# Patient Record
Sex: FEMALE | Race: WHITE | NOT HISPANIC OR LATINO
[De-identification: names, ages, dates, MRNs, and addresses within clinical notes are randomized per-mention and may not be internally consistent; named-entity substitution may affect disease eponyms.]

---

## 2018-03-26 ENCOUNTER — APPOINTMENT (OUTPATIENT)
Dept: OBGYN | Facility: CLINIC | Age: 63
End: 2018-03-26

## 2018-09-17 ENCOUNTER — APPOINTMENT (OUTPATIENT)
Dept: OBGYN | Facility: CLINIC | Age: 63
End: 2018-09-17
Payer: COMMERCIAL

## 2018-09-17 VITALS
BODY MASS INDEX: 38.49 KG/M2 | SYSTOLIC BLOOD PRESSURE: 133 MMHG | HEART RATE: 64 BPM | HEIGHT: 68 IN | WEIGHT: 254 LBS | DIASTOLIC BLOOD PRESSURE: 75 MMHG

## 2018-09-17 DIAGNOSIS — Z01.419 ENCOUNTER FOR GYNECOLOGICAL EXAMINATION (GENERAL) (ROUTINE) W/OUT ABNORMAL FINDINGS: ICD-10-CM

## 2018-09-17 PROCEDURE — 99396 PREV VISIT EST AGE 40-64: CPT

## 2018-09-19 LAB — HPV HIGH+LOW RISK DNA PNL CVX: NOT DETECTED

## 2018-09-20 LAB — CYTOLOGY CVX/VAG DOC THIN PREP: NORMAL

## 2018-09-24 DIAGNOSIS — N64.89 OTHER SPECIFIED DISORDERS OF BREAST: ICD-10-CM

## 2018-09-28 PROBLEM — N64.89 BREAST ASYMMETRY IN FEMALE: Status: ACTIVE | Noted: 2018-09-28

## 2020-07-24 ENCOUNTER — APPOINTMENT (OUTPATIENT)
Dept: RHEUMATOLOGY | Facility: CLINIC | Age: 65
End: 2020-07-24
Payer: MEDICAID

## 2020-07-24 VITALS
DIASTOLIC BLOOD PRESSURE: 60 MMHG | BODY MASS INDEX: 35.61 KG/M2 | TEMPERATURE: 97.7 F | OXYGEN SATURATION: 98 % | WEIGHT: 235 LBS | HEIGHT: 68 IN | SYSTOLIC BLOOD PRESSURE: 110 MMHG | HEART RATE: 72 BPM

## 2020-07-24 PROCEDURE — 36415 COLL VENOUS BLD VENIPUNCTURE: CPT

## 2020-07-24 PROCEDURE — 99204 OFFICE O/P NEW MOD 45 MIN: CPT | Mod: 25

## 2020-07-25 LAB
ALBUMIN SERPL ELPH-MCNC: 4.5 G/DL
ALP BLD-CCNC: 82 U/L
ALT SERPL-CCNC: 9 U/L
ANION GAP SERPL CALC-SCNC: 15 MMOL/L
APPEARANCE: CLEAR
AST SERPL-CCNC: 16 U/L
BASOPHILS # BLD AUTO: 0.05 K/UL
BASOPHILS NFR BLD AUTO: 0.6 %
BILIRUB SERPL-MCNC: 0.3 MG/DL
BILIRUBIN URINE: NEGATIVE
BLOOD URINE: NEGATIVE
BUN SERPL-MCNC: 21 MG/DL
CALCIUM SERPL-MCNC: 10.1 MG/DL
CHLORIDE SERPL-SCNC: 103 MMOL/L
CO2 SERPL-SCNC: 28 MMOL/L
COLOR: YELLOW
CREAT SERPL-MCNC: 1.03 MG/DL
CRP SERPL-MCNC: 1.33 MG/DL
EOSINOPHIL # BLD AUTO: 0.31 K/UL
EOSINOPHIL NFR BLD AUTO: 3.8 %
ERYTHROCYTE [SEDIMENTATION RATE] IN BLOOD BY WESTERGREN METHOD: 22 MM/HR
GLUCOSE QUALITATIVE U: NEGATIVE
GLUCOSE SERPL-MCNC: 151 MG/DL
HCT VFR BLD CALC: 39.6 %
HGB BLD-MCNC: 12.2 G/DL
IMM GRANULOCYTES NFR BLD AUTO: 0.4 %
KETONES URINE: NORMAL
LEUKOCYTE ESTERASE URINE: NEGATIVE
LYMPHOCYTES # BLD AUTO: 1.56 K/UL
LYMPHOCYTES NFR BLD AUTO: 19.4 %
MAN DIFF?: NORMAL
MCHC RBC-ENTMCNC: 28.4 PG
MCHC RBC-ENTMCNC: 30.8 GM/DL
MCV RBC AUTO: 92.3 FL
MONOCYTES # BLD AUTO: 0.4 K/UL
MONOCYTES NFR BLD AUTO: 5 %
NEUTROPHILS # BLD AUTO: 5.71 K/UL
NEUTROPHILS NFR BLD AUTO: 70.8 %
NITRITE URINE: NEGATIVE
PH URINE: 5.5
PLATELET # BLD AUTO: 218 K/UL
POTASSIUM SERPL-SCNC: 3.8 MMOL/L
PROT SERPL-MCNC: 6.6 G/DL
PROTEIN URINE: NEGATIVE
RBC # BLD: 4.29 M/UL
RBC # FLD: 13.6 %
RHEUMATOID FACT SER QL: 10 IU/ML
SODIUM SERPL-SCNC: 146 MMOL/L
SPECIFIC GRAVITY URINE: 1.02
UROBILINOGEN URINE: NORMAL
WBC # FLD AUTO: 8.06 K/UL

## 2020-07-25 NOTE — CONSULT LETTER
[Dear  ___] : Dear  [unfilled], [Sincerely,] : Sincerely, [Please see my note below.] : Please see my note below. [Consult Letter:] : I had the pleasure of evaluating your patient, [unfilled]. [FreeTextEntry3] : Laila Crowe MD

## 2020-07-25 NOTE — HISTORY OF PRESENT ILLNESS
[FreeTextEntry1] : This is a 63 y/o woman with hx of venous insufficiency, OA, right ruptured achilles tendon, and chronic back pain.  \par \par She developed right hand pain 1 year ago, mainly in [facility].\par She saw Dr. Sinha who told her she had some sort of arthritis for which he Rx'd her prednisone.  \par She has since developed pain in b/L knees, b/L feet, b/L shoulders\par The prednisone helped maybe 20%\par Her stiffness is 10-20 minutes\par She hasn't had prednisone since 6/2019.  \par She has been self treating with ibuprofen 800 BID which helps.  Denies any abd issues.  \par \par Aleve didn't help.  \par Had allergic reaction with mobic and voltaren. \par She had Tramadol but it made her too drowsy.  \par \par No hx of inflammatory eye disease.\par Currently has poison ivy.\par Goes to PT for her achilles.  \par \par She had HA injection, but it didn't help right knee.  She had her left knee done 2 years ago, and it was OK.  \par \par No hx of psoriasis or Crohn's personally or in family.\par Father had RA

## 2020-07-25 NOTE — ASSESSMENT
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  \par \par Plan\par -XR hands\par -XR SI joints\par -labs as ordered\par -Rx celebrex 200mg daily trial.  Has hx of allergic reaction to diclofenac and meloxicam, but not ibuprofen or naproxen.  Will avoid acetates and oxicam NSAID classes.  \par -Bring in old MRI next visit\par f/u 2:15pm  Aug 20th

## 2020-07-28 LAB
ANA SER IF-ACNC: NEGATIVE
C3 SERPL-MCNC: 148 MG/DL
C4 SERPL-MCNC: 32 MG/DL
CCP AB SER IA-ACNC: 8 UNITS
DSDNA AB SER-ACNC: <12 IU/ML
ENA RNP AB SER IA-ACNC: <0.2 AL
ENA SM AB SER IA-ACNC: <0.2 AL
ENA SS-A AB SER IA-ACNC: <0.2 AL
ENA SS-B AB SER IA-ACNC: <0.2 AL
RF+CCP IGG SER-IMP: NEGATIVE
THYROGLOB AB SERPL-ACNC: <20 IU/ML
THYROPEROXIDASE AB SERPL IA-ACNC: <10 IU/ML

## 2020-07-30 LAB — HLA-B27 RELATED AG QL: NORMAL

## 2020-08-18 ENCOUNTER — RX RENEWAL (OUTPATIENT)
Age: 65
End: 2020-08-18

## 2020-08-20 ENCOUNTER — APPOINTMENT (OUTPATIENT)
Dept: RHEUMATOLOGY | Facility: CLINIC | Age: 65
End: 2020-08-20
Payer: MEDICAID

## 2020-08-20 VITALS
OXYGEN SATURATION: 98 % | HEIGHT: 68 IN | HEART RATE: 72 BPM | BODY MASS INDEX: 36.37 KG/M2 | SYSTOLIC BLOOD PRESSURE: 130 MMHG | TEMPERATURE: 98.2 F | WEIGHT: 240 LBS | DIASTOLIC BLOOD PRESSURE: 72 MMHG

## 2020-08-20 DIAGNOSIS — Z85.828 PERSONAL HISTORY OF OTHER MALIGNANT NEOPLASM OF SKIN: ICD-10-CM

## 2020-08-20 DIAGNOSIS — M25.50 PAIN IN UNSPECIFIED JOINT: ICD-10-CM

## 2020-08-20 PROCEDURE — 99214 OFFICE O/P EST MOD 30 MIN: CPT | Mod: 25

## 2020-08-20 PROCEDURE — 36415 COLL VENOUS BLD VENIPUNCTURE: CPT

## 2020-08-20 NOTE — HISTORY OF PRESENT ILLNESS
[FreeTextEntry1] : This is a 63 y/o woman with hx of venous insufficiency, OA, infiltrative basal cell carcinoma, right ruptured achilles tendon, and chronic back pain. \par \par She developed right hand pain 1 year ago, mainly in [facility].\par She saw Dr. Sinha who told her she had some sort of arthritis for which he Rx'd her prednisone. \par She has since developed pain in b/L knees, b/L feet, b/L shoulders\par The prednisone helped maybe 20%.  She now reports that after the second 3 week course of prednisone, she did feel better.  \par Her stiffness is 10-20 minutes\par She hasn't had prednisone since 6/2019. \par She has been self treating with ibuprofen 800 BID which helps. Denies any abd issues. \par \par Aleve didn't help. \par Had allergic reaction with mobic and voltaren. \par She had Tramadol but it made her too drowsy. \par \par No hx of inflammatory eye disease.\par Currently has poison ivy.\par Goes to PT for her achilles. \par \par She had HA injection, but it didn't help right knee. She had her left knee done 2 years ago, and it was OK. \par \par No hx of psoriasis or Crohn's personally or in family.\par Father had RA \par  \par

## 2020-08-20 NOTE — ASSESSMENT
[FreeTextEntry1] : Seronegative RA\par \par Plan\par -XR hands were unremarkable\par -XR SI joints were negative.  \par -labs reviewed, neg RF/CCP, Elevated ESR/CRP\par -Rx celebrex 200mg daily.  She tolerated this well, and it did improve symptoms, but wore off by 4:30 in the afternoon.  She went back to ibuprofen 800mg BID and would like to continue this. Rx placed.  Has hx of allergic reaction to diclofenac and meloxicam, but not ibuprofen or naproxen. Will avoid acetates and oxicam NSAID classes. \par -MRI ankle reviewed, fluid in ankle joint, but no report of synovitis\par - Reviewed potential side effects of MTX which include but are not limited to elevated liver enzymes with liver damage, cytopenias/decreased blood counts, pulmonary toxicity, allergic reaction, oral sores, alopecia, birth defects.\par -Patient is aware to hold MTX if develops infection.  Patient is aware to avoid alcohol while on this medication.  \par -Rx folc acid 1mg daily\par -Baseline CXR and Hep B/C screening.  Can start MTX after these are obtained.  \par -Rx prednisone 20mg daily x 1 week, then 15mg daily x 1 week, then stop.  Repeat if needed.  \par f/u 6-8 weeks.

## 2020-08-21 LAB
HBV CORE IGG+IGM SER QL: NONREACTIVE
HBV SURFACE AB SERPL IA-ACNC: >1000 MIU/ML
HBV SURFACE AG SER QL: NONREACTIVE
HCV AB SER QL: NONREACTIVE
HCV S/CO RATIO: 0.27 S/CO

## 2020-09-17 LAB
ALBUMIN SERPL ELPH-MCNC: 4.1 G/DL
ALP BLD-CCNC: 70 U/L
ALT SERPL-CCNC: 14 U/L
ANION GAP SERPL CALC-SCNC: 10 MMOL/L
AST SERPL-CCNC: 16 U/L
BASOPHILS # BLD AUTO: 0.02 K/UL
BASOPHILS NFR BLD AUTO: 0.3 %
BILIRUB SERPL-MCNC: 0.4 MG/DL
BUN SERPL-MCNC: 25 MG/DL
CALCIUM SERPL-MCNC: 9.7 MG/DL
CHLORIDE SERPL-SCNC: 104 MMOL/L
CO2 SERPL-SCNC: 29 MMOL/L
CREAT SERPL-MCNC: 1.16 MG/DL
EOSINOPHIL # BLD AUTO: 0.31 K/UL
EOSINOPHIL NFR BLD AUTO: 4.9 %
GLUCOSE SERPL-MCNC: 105 MG/DL
HCT VFR BLD CALC: 38.6 %
HGB BLD-MCNC: 12.2 G/DL
IMM GRANULOCYTES NFR BLD AUTO: 0.5 %
LYMPHOCYTES # BLD AUTO: 1.58 K/UL
LYMPHOCYTES NFR BLD AUTO: 24.9 %
MAN DIFF?: NORMAL
MCHC RBC-ENTMCNC: 28 PG
MCHC RBC-ENTMCNC: 31.6 GM/DL
MCV RBC AUTO: 88.5 FL
MONOCYTES # BLD AUTO: 0.51 K/UL
MONOCYTES NFR BLD AUTO: 8 %
NEUTROPHILS # BLD AUTO: 3.89 K/UL
NEUTROPHILS NFR BLD AUTO: 61.4 %
PLATELET # BLD AUTO: 205 K/UL
POTASSIUM SERPL-SCNC: 4 MMOL/L
PROT SERPL-MCNC: 6.3 G/DL
RBC # BLD: 4.36 M/UL
RBC # FLD: 13.6 %
SODIUM SERPL-SCNC: 142 MMOL/L
WBC # FLD AUTO: 6.34 K/UL

## 2020-10-01 ENCOUNTER — APPOINTMENT (OUTPATIENT)
Dept: RHEUMATOLOGY | Facility: CLINIC | Age: 65
End: 2020-10-01
Payer: MEDICAID

## 2020-10-01 VITALS
TEMPERATURE: 97.9 F | OXYGEN SATURATION: 98 % | DIASTOLIC BLOOD PRESSURE: 68 MMHG | BODY MASS INDEX: 35.28 KG/M2 | SYSTOLIC BLOOD PRESSURE: 124 MMHG | HEART RATE: 70 BPM | WEIGHT: 232 LBS

## 2020-10-01 PROCEDURE — 20610 DRAIN/INJ JOINT/BURSA W/O US: CPT | Mod: 50

## 2020-10-01 PROCEDURE — 99214 OFFICE O/P EST MOD 30 MIN: CPT | Mod: 25

## 2020-10-01 RX ORDER — CELECOXIB 200 MG/1
200 CAPSULE ORAL
Qty: 30 | Refills: 0 | Status: DISCONTINUED | COMMUNITY
Start: 2020-07-24 | End: 2020-10-01

## 2020-10-01 NOTE — PROCEDURE
[FreeTextEntry1] : Procedure: b/L  knee CS injection\par Date: 10/1/20\par The procedure risks was discussed with the patient.\par Indications: therapeutic purposes \par #1 site identified in the Left knee . Verbal consent was obtained. \par Anesthesia was with ethyl chloride.\par The patient was prepped with betadine solution. \par A 25 gauge 1.5 inch needle was used.\par Injectables: Depomedrol 80 mg was injected into the site The Depomedrol was mixed with 1mL of xylocaine 1%. \par The patient tolerated the procedure well..\par There were no complications. Handouts/patient instructions were given to patient . patient was instructed to call if redness at site, a decrease in range of motion or an increase in pain is noted after procedure.\par \par #2 Identical procedure performed for right knee

## 2020-10-01 NOTE — HISTORY OF PRESENT ILLNESS
[FreeTextEntry1] : This is a 66 y/o woman with hx of venous insufficiency, OA, infiltrative basal cell carcinoma, right ruptured achilles tendon, and chronic back pain here for f/u of seronegative RA.\par \par She started MTX last visit 8/2020 and is feeling better already.  Completed a course of prednisone.  \par Denies any issues with MTX, no oral sores, rashes, hair loss, abdominal pain, cough, sob.  \par c/o knee and back pain\par Interested in muscle relaxant.\par \par Prior hx\par She developed right hand pain 1 year ago, mainly in [facility].\par She saw Dr. Sinha who told her she had some sort of arthritis for which he Rx'd her prednisone. \par She has since developed pain in b/L knees, b/L feet, b/L shoulders\par The prednisone helped maybe 20%. She now reports that after the second 3 week course of prednisone, she did feel better. \par Her stiffness is 10-20 minutes\par She hasn't had prednisone since 6/2019. \par She has been self treating with ibuprofen 800 BID which helps. Denies any abd issues. \par \par Aleve didn't help. \par Had allergic reaction with mobic and voltaren. \par She had Tramadol but it made her too drowsy. \par \par No hx of inflammatory eye disease.\par Currently has poison ivy.\par Goes to PT for her achilles. \par \par She had HA injection, but it didn't help right knee. She had her left knee done 2 years ago, and it was OK. \par \par No hx of psoriasis or Crohn's personally or in family.\par Father had RA \par

## 2020-10-01 NOTE — ASSESSMENT
[FreeTextEntry1] : Seronegative RA\par \par Plan\par -XR hands were unremarkable\par -XR SI joints were negative. \par -labs reviewed, neg RF/CCP, Elevated ESR/CRP\par -She is back to ibuprofen 800mg BID and would like to continue this. Rx placed. Has hx of allergic reaction to diclofenac and meloxicam, but not ibuprofen or naproxen. Will avoid acetates and oxicam NSAID classes. \par -MRI ankle reviewed, fluid in ankle joint, but no report of synovitis\par - Reviewed potential side effects of MTX which include but are not limited to elevated liver enzymes with liver damage, cytopenias/decreased blood counts, pulmonary toxicity, allergic reaction, oral sores, alopecia, birth defects.\par -Inc MTX to 5 tabs q week.  \par -Patient is aware to hold MTX if develops infection. Patient is aware to avoid alcohol while on this medication. \par -Rx folc acid 1mg daily\par -Baseline CXR and Hep B/C screening. Can start MTX after these are obtained. \par -Completed prednisone course\par \par OA knees\par -IA depomedrol 80mg b/L\par \par Back pain. \par -Rx tizanidine 2mg QHS for back pain\par \par f/u 5 weeks\par

## 2020-10-27 ENCOUNTER — RX RENEWAL (OUTPATIENT)
Age: 65
End: 2020-10-27

## 2020-11-05 ENCOUNTER — APPOINTMENT (OUTPATIENT)
Dept: RHEUMATOLOGY | Facility: CLINIC | Age: 65
End: 2020-11-05
Payer: MEDICAID

## 2020-11-05 VITALS
TEMPERATURE: 97.6 F | HEART RATE: 72 BPM | SYSTOLIC BLOOD PRESSURE: 112 MMHG | OXYGEN SATURATION: 99 % | WEIGHT: 223 LBS | DIASTOLIC BLOOD PRESSURE: 70 MMHG | BODY MASS INDEX: 33.8 KG/M2 | HEIGHT: 68 IN

## 2020-11-05 PROCEDURE — 99072 ADDL SUPL MATRL&STAF TM PHE: CPT

## 2020-11-05 PROCEDURE — 36415 COLL VENOUS BLD VENIPUNCTURE: CPT

## 2020-11-05 PROCEDURE — 99214 OFFICE O/P EST MOD 30 MIN: CPT | Mod: 25

## 2020-11-05 RX ORDER — PREDNISONE 5 MG/1
5 TABLET ORAL
Qty: 49 | Refills: 1 | Status: DISCONTINUED | COMMUNITY
Start: 2020-08-20 | End: 2020-11-05

## 2020-11-05 NOTE — HISTORY OF PRESENT ILLNESS
[FreeTextEntry1] : This is a 64 y/o woman with hx of venous insufficiency, OA, infiltrative basal cell carcinoma, right ruptured achilles tendon, and chronic back pain here for f/u of seronegative RA.\par \par She started MTX last visit 8/2020 and is feeling better already. Completed a course of prednisone. \par Denies any issues with MTX, no oral sores, rashes, hair loss, abdominal pain, cough, sob. \par c/o knee and back pain\par \par Hands are better, but knees still bother her.  \par Her back bothers her.\par Ibuprofen is effective for the back\par Knees bother her most.  \par \par AM stiffness is 5 minutes.  \par \par Prior hx\par She developed right hand pain 1 year ago, mainly in [facility].\par She saw Dr. Sinha who told her she had some sort of arthritis for which he Rx'd her prednisone. \par She has since developed pain in b/L knees, b/L feet, b/L shoulders\par The prednisone helped maybe 20%. She now reports that after the second 3 week course of prednisone, she did feel better. \par Her stiffness is 10-20 minutes\par She hasn't had prednisone since 6/2019. \par She has been self treating with ibuprofen 800 BID which helps. Denies any abd issues. \par \par Aleve didn't help. \par Had allergic reaction with mobic and voltaren. \par She had Tramadol but it made her too drowsy. \par \par No hx of inflammatory eye disease.\par Currently has poison ivy.\par Goes to PT for her achilles. \par \par \par \par She had HA injection, but it didn't help right knee. She had her left knee done 2 years ago, and it was OK. \par \par No hx of psoriasis or Crohn's personally or in family.\par Father had RA

## 2020-11-05 NOTE — ASSESSMENT
[FreeTextEntry1] : Seronegative RA\par \par Plan\par -XR hands were unremarkable\par -XR SI joints were negative. \par -labs reviewed, neg RF/CCP, Elevated ESR/CRP\par -She takes ibuprofen 800mg once in a day as needed and would like to continue this. Rx placed. Has hx of allergic reaction to diclofenac and meloxicam, but not ibuprofen or naproxen. Will avoid acetates and oxicam NSAID classes. Sample of duexis given and advised if she wants to continue with Pepcid separately from OTC, she can.  \par -MRI ankle reviewed, fluid in ankle joint, but no report of synovitis\par - Reviewed potential side effects of MTX which include but are not limited to elevated liver enzymes with liver damage, cytopenias/decreased blood counts, pulmonary toxicity, allergic reaction, oral sores, alopecia, birth defects.\par -cont MTX 5 tabs q week, labs today.   \par -Patient is aware to hold MTX if develops infection. Patient is aware to avoid alcohol while on this medication. \par -Rx folc acid 1mg daily\par -Baseline CXR and Hep B/C screening. Can start MTX after these are obtained. \par -Completed prednisone course\par \par OA knees\par -IA depomedrol 80mg b/L was not all that helpful.  \par \par Back pain- improved with med\par -Rx tizanidine 2mg QHS for back pain\par \par f/u 1 week after MRI of knee.  She will call to make appt as soon as she makes appt for MRI knee.  \par . \par

## 2020-11-06 ENCOUNTER — TRANSCRIPTION ENCOUNTER (OUTPATIENT)
Age: 65
End: 2020-11-06

## 2020-11-06 LAB
ALBUMIN SERPL ELPH-MCNC: 4.6 G/DL
ALP BLD-CCNC: 77 U/L
ALT SERPL-CCNC: 15 U/L
ANION GAP SERPL CALC-SCNC: 13 MMOL/L
AST SERPL-CCNC: 17 U/L
BASOPHILS # BLD AUTO: 0.03 K/UL
BASOPHILS NFR BLD AUTO: 0.3 %
BILIRUB SERPL-MCNC: 0.4 MG/DL
BUN SERPL-MCNC: 16 MG/DL
CALCIUM SERPL-MCNC: 10 MG/DL
CHLORIDE SERPL-SCNC: 101 MMOL/L
CO2 SERPL-SCNC: 28 MMOL/L
CREAT SERPL-MCNC: 1.15 MG/DL
EOSINOPHIL # BLD AUTO: 0.17 K/UL
EOSINOPHIL NFR BLD AUTO: 1.7 %
GLUCOSE SERPL-MCNC: 112 MG/DL
HCT VFR BLD CALC: 38.1 %
HGB BLD-MCNC: 12.5 G/DL
IMM GRANULOCYTES NFR BLD AUTO: 0.3 %
LYMPHOCYTES # BLD AUTO: 2.23 K/UL
LYMPHOCYTES NFR BLD AUTO: 21.9 %
MAN DIFF?: NORMAL
MCHC RBC-ENTMCNC: 29.1 PG
MCHC RBC-ENTMCNC: 32.8 GM/DL
MCV RBC AUTO: 88.8 FL
MONOCYTES # BLD AUTO: 0.54 K/UL
MONOCYTES NFR BLD AUTO: 5.3 %
NEUTROPHILS # BLD AUTO: 7.19 K/UL
NEUTROPHILS NFR BLD AUTO: 70.5 %
PLATELET # BLD AUTO: 249 K/UL
POTASSIUM SERPL-SCNC: 3.6 MMOL/L
PROT SERPL-MCNC: 6.7 G/DL
RBC # BLD: 4.29 M/UL
RBC # FLD: 15 %
SODIUM SERPL-SCNC: 142 MMOL/L
WBC # FLD AUTO: 10.19 K/UL

## 2020-11-10 ENCOUNTER — APPOINTMENT (OUTPATIENT)
Dept: VASCULAR SURGERY | Facility: CLINIC | Age: 65
End: 2020-11-10
Payer: MEDICAID

## 2020-11-10 VITALS
SYSTOLIC BLOOD PRESSURE: 120 MMHG | BODY MASS INDEX: 35.63 KG/M2 | HEART RATE: 66 BPM | TEMPERATURE: 97.4 F | HEIGHT: 67 IN | OXYGEN SATURATION: 98 % | RESPIRATION RATE: 16 BRPM | WEIGHT: 227.03 LBS | DIASTOLIC BLOOD PRESSURE: 69 MMHG

## 2020-11-10 PROCEDURE — 99072 ADDL SUPL MATRL&STAF TM PHE: CPT

## 2020-11-10 PROCEDURE — 99203 OFFICE O/P NEW LOW 30 MIN: CPT

## 2020-11-10 PROCEDURE — 93970 EXTREMITY STUDY: CPT

## 2020-11-10 RX ORDER — ZOLPIDEM TARTRATE 10 MG/1
10 TABLET, FILM COATED ORAL
Refills: 0 | Status: ACTIVE | COMMUNITY

## 2020-11-10 RX ORDER — ZOLPIDEM TARTRATE 5 MG/1
TABLET ORAL
Refills: 0 | Status: COMPLETED | COMMUNITY
End: 2020-11-10

## 2020-11-10 RX ORDER — ESCITALOPRAM OXALATE 10 MG/1
10 TABLET ORAL
Qty: 30 | Refills: 0 | Status: ACTIVE | COMMUNITY
Start: 2020-07-07

## 2020-11-20 ENCOUNTER — APPOINTMENT (OUTPATIENT)
Dept: RHEUMATOLOGY | Facility: CLINIC | Age: 65
End: 2020-11-20

## 2020-11-24 ENCOUNTER — APPOINTMENT (OUTPATIENT)
Dept: RHEUMATOLOGY | Facility: CLINIC | Age: 65
End: 2020-11-24
Payer: MEDICAID

## 2020-11-24 VITALS
DIASTOLIC BLOOD PRESSURE: 60 MMHG | HEART RATE: 59 BPM | WEIGHT: 220 LBS | TEMPERATURE: 97 F | SYSTOLIC BLOOD PRESSURE: 120 MMHG | OXYGEN SATURATION: 98 % | HEIGHT: 67 IN | BODY MASS INDEX: 34.53 KG/M2

## 2020-11-24 PROCEDURE — 20610 DRAIN/INJ JOINT/BURSA W/O US: CPT | Mod: RT

## 2020-11-24 PROCEDURE — 99214 OFFICE O/P EST MOD 30 MIN: CPT | Mod: 25

## 2020-11-24 RX ORDER — METHYLPRED ACET/NACL,ISO-OS/PF 80 MG/ML
80 VIAL (ML) INJECTION
Qty: 1 | Refills: 0 | Status: COMPLETED | OUTPATIENT
Start: 2020-11-24

## 2020-11-24 RX ADMIN — METHYLPREDNISOLONE ACETATE 0 MG/ML: 80 INJECTION, SUSPENSION INTRA-ARTICULAR; INTRALESIONAL; INTRAMUSCULAR; SOFT TISSUE at 00:00

## 2020-11-24 NOTE — PROCEDURE
[FreeTextEntry1] : Procedure: Right PAB CS injection\par Date: 11/24/20\par The procedure risks was discussed with the patient.\par Indications: therapeutic purposes \par #1 site identified in the Right PAB . Verbal consent was obtained. \par Anesthesia was with ethyl chloride.\par The patient was prepped with betadine solution. \par A 25 gauge 1.5 inch needle was used.\par Injectables: Depomedrol 80 mg was injected into the site The Depomedrol was mixed with 1mL of xylocaine 1%. \par The patient tolerated the procedure well..\par There were no complications. Handouts/patient instructions were given to patient . patient was instructed to call if redness at site, a decrease in range of motion or an increase in pain is noted after procedure. \par  \par \par

## 2020-11-24 NOTE — ASSESSMENT
[FreeTextEntry1] : \par Plan\par -XR hands were unremarkable\par -XR SI joints were negative. \par -labs reviewed, neg RF/CCP, Elevated ESR/CRP\par -She takes ibuprofen 800mg once in a day as needed and would like to continue this. Rx placed. Has hx of allergic reaction to diclofenac and meloxicam, but not ibuprofen or naproxen. Will avoid acetates and oxicam NSAID classes. Sample of duexis given and advised if she wants to continue with Pepcid separately from OTC, she can. \par -MRI ankle reviewed, fluid in ankle joint, but no report of synovitis\par - Reviewed potential side effects of MTX which include but are not limited to elevated liver enzymes with liver damage, cytopenias/decreased blood counts, pulmonary toxicity, allergic reaction, oral sores, alopecia, birth defects.\par -cont MTX 5 tabs q week, labs 12/5/20\par -Patient is aware to hold MTX if develops infection. Patient is aware to avoid alcohol while on this medication. \par -Rx folc acid 1mg daily\par -Baseline CXR and Hep B/C screening were negative\par -Completed prednisone course\par \par OA knees/knee pain\par -IA depomedrol 80mg b/L was not all that helpful. \par -Orthopedic eval for meniscal tear, partially ruptured cyst\par \par Right PAB\par -80mg depomedrol injection\par \par Back pain- improved with med\par -Rx tizanidine 2mg QHS for back pain\par \par \par f/u end of Decmebver\par

## 2020-11-24 NOTE — HISTORY OF PRESENT ILLNESS
[FreeTextEntry1] : This is a 64 y/o woman with hx of venous insufficiency, OA, infiltrative basal cell carcinoma, right ruptured achilles tendon, and chronic back pain here for f/u of seronegative RA.\par \par She started MTX  8/2020 and has felt improvement.   Completed a course of prednisone. \par Denies any issues with MTX, no oral sores, rashes, hair loss, abdominal pain, cough, sob. \par c/o knee and back pain\par Pain level is 6/10 inintensity with 5 minutes of AM stiffness.  \par Latest labs 11/2/20 show normal ESR, CRP and 11/5/20 CBC, CMP unremarkable.  \par Hands are better, but knees still bother her. \par Her back bothers her.\par Ibuprofen is effective for the back\par Knees bother her most. She has pain mainly in the back.  She also has pain in right medial knee.  \par \par MRI right knee shows 11/13/20 meniscal tear, partial rupture of para meniscal cyst.  Small joint effusion, chondromalacia.  \par \par AM stiffness is 5 minutes. \par \par Prior hx\par She developed right hand pain 1 year ago, mainly in [facility].\par She saw Dr. Sinha who told her she had some sort of arthritis for which he Rx'd her prednisone. \par She has since developed pain in b/L knees, b/L feet, b/L shoulders\par The prednisone helped maybe 20%. She now reports that after the second 3 week course of prednisone, she did feel better. \par Her stiffness is 10-20 minutes\par She hasn't had prednisone since 6/2019. \par She has been self treating with ibuprofen 800 BID which helps. Denies any abd issues. \par \par Aleve didn't help. \par Had allergic reaction with mobic and voltaren. \par She had Tramadol but it made her too drowsy. \par

## 2020-12-15 DIAGNOSIS — Z01.818 ENCOUNTER FOR OTHER PREPROCEDURAL EXAMINATION: ICD-10-CM

## 2020-12-17 ENCOUNTER — APPOINTMENT (OUTPATIENT)
Dept: DISASTER EMERGENCY | Facility: CLINIC | Age: 65
End: 2020-12-17

## 2020-12-18 ENCOUNTER — APPOINTMENT (OUTPATIENT)
Dept: DISASTER EMERGENCY | Facility: CLINIC | Age: 65
End: 2020-12-18

## 2020-12-19 LAB — SARS-COV-2 N GENE NPH QL NAA+PROBE: NOT DETECTED

## 2020-12-22 ENCOUNTER — APPOINTMENT (OUTPATIENT)
Dept: VASCULAR SURGERY | Facility: CLINIC | Age: 65
End: 2020-12-22
Payer: MEDICAID

## 2020-12-22 VITALS
BODY MASS INDEX: 34.53 KG/M2 | SYSTOLIC BLOOD PRESSURE: 160 MMHG | WEIGHT: 220 LBS | HEIGHT: 67 IN | HEART RATE: 64 BPM | OXYGEN SATURATION: 97 % | DIASTOLIC BLOOD PRESSURE: 73 MMHG | TEMPERATURE: 97.6 F

## 2020-12-22 PROCEDURE — 99072 ADDL SUPL MATRL&STAF TM PHE: CPT

## 2020-12-22 PROCEDURE — 36475 ENDOVENOUS RF 1ST VEIN: CPT | Mod: RT

## 2020-12-29 ENCOUNTER — APPOINTMENT (OUTPATIENT)
Dept: VASCULAR SURGERY | Facility: CLINIC | Age: 65
End: 2020-12-29
Payer: MEDICAID

## 2020-12-29 VITALS
DIASTOLIC BLOOD PRESSURE: 78 MMHG | HEIGHT: 67 IN | HEART RATE: 63 BPM | OXYGEN SATURATION: 97 % | TEMPERATURE: 98 F | SYSTOLIC BLOOD PRESSURE: 130 MMHG | BODY MASS INDEX: 34.53 KG/M2 | WEIGHT: 220 LBS

## 2020-12-29 PROCEDURE — 99072 ADDL SUPL MATRL&STAF TM PHE: CPT

## 2020-12-29 PROCEDURE — 93971 EXTREMITY STUDY: CPT

## 2020-12-29 PROCEDURE — 29580 STRAPPING UNNA BOOT: CPT | Mod: LT

## 2020-12-29 RX ORDER — PREDNISONE 10 MG/1
10 TABLET ORAL
Qty: 40 | Refills: 0 | Status: COMPLETED | COMMUNITY
Start: 2020-09-22 | End: 2020-12-29

## 2020-12-29 RX ORDER — DICLOFENAC SODIUM 1% 10 MG/G
1 GEL TOPICAL
Qty: 100 | Refills: 0 | Status: COMPLETED | COMMUNITY
Start: 2019-11-08 | End: 2020-12-29

## 2020-12-29 RX ORDER — IBUPROFEN 800 MG/1
800 TABLET, FILM COATED ORAL
Qty: 60 | Refills: 1 | Status: COMPLETED | COMMUNITY
Start: 2020-08-20 | End: 2020-12-29

## 2020-12-29 RX ORDER — PREDNISONE 20 MG/1
20 TABLET ORAL
Qty: 10 | Refills: 0 | Status: COMPLETED | COMMUNITY
Start: 2020-10-15 | End: 2020-12-29

## 2020-12-29 RX ORDER — SULFAMETHOXAZOLE AND TRIMETHOPRIM 800; 160 MG/1; MG/1
800-160 TABLET ORAL
Qty: 20 | Refills: 0 | Status: COMPLETED | COMMUNITY
Start: 2020-10-15 | End: 2020-12-29

## 2020-12-29 RX ORDER — CEPHALEXIN 250 MG/1
250 CAPSULE ORAL
Qty: 8 | Refills: 0 | Status: COMPLETED | COMMUNITY
Start: 2020-12-08 | End: 2020-12-29

## 2020-12-31 ENCOUNTER — APPOINTMENT (OUTPATIENT)
Dept: RHEUMATOLOGY | Facility: CLINIC | Age: 65
End: 2020-12-31
Payer: MEDICAID

## 2020-12-31 VITALS
TEMPERATURE: 97.1 F | BODY MASS INDEX: 35.87 KG/M2 | HEART RATE: 66 BPM | WEIGHT: 229 LBS | OXYGEN SATURATION: 96 % | DIASTOLIC BLOOD PRESSURE: 68 MMHG | SYSTOLIC BLOOD PRESSURE: 106 MMHG

## 2020-12-31 PROCEDURE — 99214 OFFICE O/P EST MOD 30 MIN: CPT

## 2020-12-31 PROCEDURE — 99072 ADDL SUPL MATRL&STAF TM PHE: CPT

## 2020-12-31 NOTE — HISTORY OF PRESENT ILLNESS
[FreeTextEntry1] : This is a 66 y/o woman with hx of venous insufficiency, OA, infiltrative basal cell carcinoma, right ruptured achilles tendon, and chronic back pain here for f/u of seronegative RA.\par \par She started MTX 8/2020 and has felt improvement. Completed a course of prednisone. \par Denies any issues with MTX, no oral sores, rashes, hair loss, abdominal pain, cough, sob. \par c/o knee and back pain\par Pain level is 6/10 in intensity with 5 minutes of AM stiffness. \par Fingers are starting to bother her again right 2nd-3rd MCP area.  \par \par She has a left ulcer wound secondary to minor trauma to the skin.  She has venous insufficiency and started on augmentin yesterday.\par She had Moh's procedure done.  \par \par \par MRI right knee shows 11/13/20 meniscal tear, partial rupture of para meniscal cyst. Small joint effusion, chondromalacia. \par \par AM stiffness is 5 minutes. \par \par Prior hx\par She developed right hand pain 1 year ago, mainly in [facility].\par She saw Dr. Sinha who told her she had some sort of arthritis for which he Rx'd her prednisone. \par She has since developed pain in b/L knees, b/L feet, b/L shoulders\par The prednisone helped maybe 20%. She now reports that after the second 3 week course of prednisone, she did feel better. \par Her stiffness is 10-20 minutes\par She hasn't had prednisone since 6/2019. \par She has been self treating with ibuprofen 800 BID which helps. Denies any abd issues. \par \par Aleve didn't help. \par Had allergic reaction with mobic and voltaren. \par She had Tramadol but it made her too drowsy. \par

## 2020-12-31 NOTE — ASSESSMENT
[FreeTextEntry1] : \par \par Plan\par -XR hands were unremarkable\par -XR SI joints were negative. \par -labs reviewed, neg RF/CCP, Elevated ESR/CRP\par -She takes ibuprofen 800mg once in a day as needed and would like to continue this. Rx placed. Has hx of allergic reaction to diclofenac and meloxicam, but not ibuprofen or naproxen. Will avoid acetates and oxicam NSAID classes. Sample of duexis given and advised if she wants to continue with Pepcid separately from OTC, she can. \par -MRI ankle reviewed, fluid in ankle joint, but no report of synovitis\par - Reviewed potential side effects of MTX which include but are not limited to elevated liver enzymes with liver damage, cytopenias/decreased blood counts, pulmonary toxicity, allergic reaction, oral sores, alopecia, birth defects.\par -As she has a cellulitis, she will need to hold the MTX while infection is being treated.  She has vascular f/u next week.  Once she is clear of infection, she can restart  MTX at increased dose of 6 tabs q week.  Cont labs monthly.  \par -Patient is aware to hold MTX if develops infection. Patient is aware to avoid alcohol while on this medication. \par -Rx folc acid 1mg daily\par -Baseline CXR and Hep B/C screening were negative\par -Completed prednisone course\par \par OA knees/knee pain\par -IA depomedrol 80mg b/L was not all that helpful. \par -Orthopedic eval for meniscal tear, partially ruptured cyst\par \par Right PAB\par -80mg depomedrol injection given previously\par \par Back pain- improved with med\par -Rx tizanidine 2mg QHS for back pain\par \par \par f/u 1 month\par May need VECTRA testing as she is slightly symptomatic in her RA joins but inflammatory markers are normal.  \par I advised her to also ask vascular on feasibility of using prn short courses of steroid to help keep RA flare down.\par If it will be a long time off MTX, may need to add/switch to SSZ.  \par

## 2021-01-06 ENCOUNTER — NON-APPOINTMENT (OUTPATIENT)
Age: 66
End: 2021-01-06

## 2021-01-06 ENCOUNTER — APPOINTMENT (OUTPATIENT)
Dept: VASCULAR SURGERY | Facility: CLINIC | Age: 66
End: 2021-01-06
Payer: MEDICAID

## 2021-01-06 VITALS
TEMPERATURE: 98 F | HEART RATE: 64 BPM | WEIGHT: 229 LBS | OXYGEN SATURATION: 97 % | HEIGHT: 67 IN | BODY MASS INDEX: 35.94 KG/M2 | DIASTOLIC BLOOD PRESSURE: 69 MMHG | SYSTOLIC BLOOD PRESSURE: 115 MMHG

## 2021-01-06 DIAGNOSIS — L03.116 CELLULITIS OF LEFT LOWER LIMB: ICD-10-CM

## 2021-01-06 PROCEDURE — 99072 ADDL SUPL MATRL&STAF TM PHE: CPT

## 2021-01-06 PROCEDURE — 99213 OFFICE O/P EST LOW 20 MIN: CPT

## 2021-01-19 ENCOUNTER — APPOINTMENT (OUTPATIENT)
Dept: VASCULAR SURGERY | Facility: CLINIC | Age: 66
End: 2021-01-19
Payer: MEDICAID

## 2021-01-19 VITALS
HEIGHT: 67 IN | WEIGHT: 228 LBS | HEART RATE: 66 BPM | OXYGEN SATURATION: 96 % | BODY MASS INDEX: 35.79 KG/M2 | DIASTOLIC BLOOD PRESSURE: 72 MMHG | TEMPERATURE: 97 F | SYSTOLIC BLOOD PRESSURE: 116 MMHG

## 2021-01-19 PROCEDURE — 99213 OFFICE O/P EST LOW 20 MIN: CPT

## 2021-01-19 PROCEDURE — 99072 ADDL SUPL MATRL&STAF TM PHE: CPT

## 2021-01-19 RX ORDER — AMOXICILLIN AND CLAVULANATE POTASSIUM 875; 125 MG/1; MG/1
875-125 TABLET, COATED ORAL
Qty: 20 | Refills: 0 | Status: COMPLETED | COMMUNITY
Start: 2020-12-29 | End: 2021-01-19

## 2021-01-28 ENCOUNTER — APPOINTMENT (OUTPATIENT)
Dept: RHEUMATOLOGY | Facility: CLINIC | Age: 66
End: 2021-01-28
Payer: MEDICAID

## 2021-01-28 VITALS
DIASTOLIC BLOOD PRESSURE: 72 MMHG | SYSTOLIC BLOOD PRESSURE: 122 MMHG | OXYGEN SATURATION: 96 % | WEIGHT: 223 LBS | BODY MASS INDEX: 34.93 KG/M2 | TEMPERATURE: 98.1 F | HEART RATE: 72 BPM

## 2021-01-28 DIAGNOSIS — M70.50 OTHER BURSITIS OF KNEE, UNSPECIFIED KNEE: ICD-10-CM

## 2021-01-28 PROCEDURE — 99072 ADDL SUPL MATRL&STAF TM PHE: CPT

## 2021-01-28 PROCEDURE — 99214 OFFICE O/P EST MOD 30 MIN: CPT

## 2021-01-28 NOTE — HISTORY OF PRESENT ILLNESS
[FreeTextEntry1] : This is a 64 y/o woman with hx of venous insufficiency, OA, infiltrative basal cell carcinoma 11/2020, right ruptured achilles tendon, and chronic back pain here for f/u of seronegative RA.\par \par She started MTX 8/2020 and has felt improvement. Completed a course of prednisone. \par Denies any issues with MTX, no oral sores, rashes, hair loss, abdominal pain, cough, sob. \par c/o knee and back pain\par Pain level is 5/10 in intensity with 5 minutes of AM stiffness. \par \par Her knees bother her.  When she carries packages or bringing things up the stairs, she has pain in the back of the knees.  \par She is going to a cardiologist to check on her breathlessness.  \par Tiny cough.  \par She is getting Gel shots, 3rd shot both knees, next week.\par Her orthopedic thinks it's coming from her back.  \par \par MRI right knee shows 11/13/20 meniscal tear, partial rupture of para meniscal cyst. Small joint effusion, chondromalacia. \par \par AM stiffness is 5 minutes. \par \par Prior hx\par She developed right hand pain 1 year ago, mainly in [facility].\par She saw Dr. Sinha who told her she had some sort of arthritis for which he Rx'd her prednisone. \par She has since developed pain in b/L knees, b/L feet, b/L shoulders\par The prednisone helped maybe 20%. She now reports that after the second 3 week course of prednisone, she did feel better. \par Her stiffness is 10-20 minutes\par She hasn't had prednisone since 6/2019. \par She has been self treating with ibuprofen 800 BID which helps. Denies any abd issues. \par \par Aleve didn't help. \par Had allergic reaction with mobic and voltaren. \par She had Tramadol but it made her too drowsy. \par \par

## 2021-01-28 NOTE — ASSESSMENT
[FreeTextEntry1] : Plan\par -XR hands were unremarkable\par -XR SI joints were negative. \par -labs reviewed, neg RF/CCP, Elevated ESR/CRP\par -She takes ibuprofen 800mg once in a day as needed and would like to continue this. Rx placed. Has hx of allergic reaction to diclofenac and meloxicam, but not ibuprofen or naproxen. Will avoid acetates and oxicam NSAID classes. Sample of duexis given and advised if she wants to continue with Pepcid separately from OTC, she can. \par -MRI ankle reviewed, fluid in ankle joint, but no report of synovitis\par - Reviewed potential side effects of MTX which include but are not limited to elevated liver enzymes with liver damage, cytopenias/decreased blood counts, pulmonary toxicity, allergic reaction, oral sores, alopecia, birth defects.\par -Will switch MTX to 15mg subQ.  Samples of Rasuvo were given.  \par -Patient is aware to hold MTX if develops infection. Patient is aware to avoid alcohol while on this medication. \par -Rx folc acid 1mg daily\par -Baseline CXR and Hep B/C screening were negative\par -Completed prednisone course\par \par OA knees/knee pain\par -IA depomedrol 80mg b/L was not all that helpful. \par -Orthopedic eval for meniscal tear, partially ruptured cyst.  She is currently having HA injections b/L.  \par -Posterior knee pain? tendonitis.  \par \par Right PAB\par -80mg depomedrol injection given previously\par \par Back pain- improved with med\par -Rx tizanidine 2mg QHS for back pain\par \par f/u 6 weeks with labs, vectra done beforehand.  \par Counseled on MTX and covid vaccine.

## 2021-01-29 ENCOUNTER — RX RENEWAL (OUTPATIENT)
Age: 66
End: 2021-01-29

## 2021-02-02 ENCOUNTER — RX RENEWAL (OUTPATIENT)
Age: 66
End: 2021-02-02

## 2021-02-08 ENCOUNTER — APPOINTMENT (OUTPATIENT)
Dept: VASCULAR SURGERY | Facility: CLINIC | Age: 66
End: 2021-02-08

## 2021-02-10 ENCOUNTER — APPOINTMENT (OUTPATIENT)
Dept: VASCULAR SURGERY | Facility: CLINIC | Age: 66
End: 2021-02-10
Payer: MEDICAID

## 2021-02-10 VITALS
RESPIRATION RATE: 16 BRPM | DIASTOLIC BLOOD PRESSURE: 71 MMHG | WEIGHT: 232.03 LBS | HEIGHT: 67 IN | HEART RATE: 67 BPM | OXYGEN SATURATION: 100 % | TEMPERATURE: 97.5 F | BODY MASS INDEX: 36.42 KG/M2 | SYSTOLIC BLOOD PRESSURE: 128 MMHG

## 2021-02-10 DIAGNOSIS — S81.802A UNSPECIFIED OPEN WOUND, LEFT LOWER LEG, INITIAL ENCOUNTER: ICD-10-CM

## 2021-02-10 PROCEDURE — 99072 ADDL SUPL MATRL&STAF TM PHE: CPT

## 2021-02-10 PROCEDURE — 99213 OFFICE O/P EST LOW 20 MIN: CPT

## 2021-02-10 RX ORDER — MUPIROCIN 20 MG/G
2 OINTMENT TOPICAL
Qty: 22 | Refills: 0 | Status: COMPLETED | COMMUNITY
Start: 2020-01-21 | End: 2021-02-10

## 2021-02-10 RX ORDER — METHOTREXATE 15 MG/.3ML
15 INJECTION, SOLUTION SUBCUTANEOUS
Qty: 4 | Refills: 2 | Status: DISCONTINUED | COMMUNITY
Start: 2021-01-28 | End: 2021-02-10

## 2021-02-10 RX ORDER — HYDROCHLOROTHIAZIDE 12.5 MG/1
12.5 TABLET ORAL
Qty: 90 | Refills: 0 | Status: COMPLETED | COMMUNITY
Start: 2020-12-30 | End: 2021-02-10

## 2021-02-10 RX ORDER — DICLOFENAC EPOLAMINE 0.01 G/1
1.3 SYSTEM TOPICAL
Qty: 30 | Refills: 0 | Status: COMPLETED | COMMUNITY
Start: 2020-09-30 | End: 2021-02-10

## 2021-02-23 ENCOUNTER — RX RENEWAL (OUTPATIENT)
Age: 66
End: 2021-02-23

## 2021-03-01 ENCOUNTER — APPOINTMENT (OUTPATIENT)
Dept: VASCULAR SURGERY | Facility: CLINIC | Age: 66
End: 2021-03-01
Payer: MEDICAID

## 2021-03-01 VITALS
HEART RATE: 68 BPM | DIASTOLIC BLOOD PRESSURE: 73 MMHG | SYSTOLIC BLOOD PRESSURE: 122 MMHG | BODY MASS INDEX: 36.41 KG/M2 | OXYGEN SATURATION: 98 % | TEMPERATURE: 98.7 F | WEIGHT: 232 LBS | HEIGHT: 67 IN

## 2021-03-01 DIAGNOSIS — G57.02 LESION OF SCIATIC NERVE, LEFT LOWER LIMB: ICD-10-CM

## 2021-03-01 PROCEDURE — 99213 OFFICE O/P EST LOW 20 MIN: CPT

## 2021-03-01 PROCEDURE — 99072 ADDL SUPL MATRL&STAF TM PHE: CPT

## 2021-03-09 ENCOUNTER — APPOINTMENT (OUTPATIENT)
Dept: RHEUMATOLOGY | Facility: CLINIC | Age: 66
End: 2021-03-09
Payer: MEDICAID

## 2021-03-09 VITALS
SYSTOLIC BLOOD PRESSURE: 126 MMHG | OXYGEN SATURATION: 97 % | TEMPERATURE: 97.6 F | WEIGHT: 230 LBS | BODY MASS INDEX: 36.02 KG/M2 | HEART RATE: 69 BPM | DIASTOLIC BLOOD PRESSURE: 70 MMHG

## 2021-03-09 PROCEDURE — 99072 ADDL SUPL MATRL&STAF TM PHE: CPT

## 2021-03-09 PROCEDURE — 99214 OFFICE O/P EST MOD 30 MIN: CPT

## 2021-03-09 NOTE — HISTORY OF PRESENT ILLNESS
[FreeTextEntry1] : This is a 66 y/o woman with hx of venous insufficiency, OA, infiltrative basal cell carcinoma 11/2020, right ruptured achilles tendon, and chronic back pain here for f/u of seronegative RA.\par \par She started MTX 8/2020 and has felt improvement. Completed a course of prednisone. \par Denies any issues with MTX, no oral sores, rashes, hair loss, abdominal pain, cough, sob. \par \par She has done 2 weeks of injection MTX.  \par She is going to have vein stripping\par She is going to have her covid vaccine in 2 days.  \par \par She was taking Vit D 2000IU, but then stopped because she though \par \par Pain level is 710 in intensity with 5 minutes of AM stiffness. \par Her right knee anteriorly and right lateral thigh pain.  \par \par She is seeing cardiologist to check on her SOB and fatigue\par No more cough.\par She finished gel shots for knees.  \par Her orthopedic thinks it's coming from her back. \par \par MRI right knee shows 11/13/20 meniscal tear, partial rupture of para meniscal cyst. Small joint effusion, chondromalacia. \par \par The tizanidine has been helpful.  \par \par \par Prior hx\par She developed right hand pain 1 year ago, mainly in [facility].\par She saw Dr. Sinha who told her she had some sort of arthritis for which he Rx'd her prednisone. \par She has since developed pain in b/L knees, b/L feet, b/L shoulders\par The prednisone helped maybe 20%. She now reports that after the second 3 week course of prednisone, she did feel better. \par Her stiffness is 10-20 minutes\par She hasn't had prednisone since 6/2019. \par She has been self treating with ibuprofen 800 BID which helps. Denies any abd issues. \par \par Aleve didn't help. \par Had allergic reaction with mobic and voltaren. \par She had Tramadol but it made her too drowsy. \par \par \par

## 2021-03-09 NOTE — ASSESSMENT
[FreeTextEntry1] : Plan\par -Rx medrol dosepak for flare, though advised to try and wait at least 1 week after covid vaccine if possible.  \par -XR hands were unremarkable\par -XR SI joints were negative. \par -labs reviewed, neg RF/CCP, Elevated ESR/CRP\par -She takes ibuprofen 800mg once in a day as needed and would like to continue this. Rx placed. Has hx of allergic reaction to diclofenac and meloxicam, but not ibuprofen or naproxen. Will avoid acetates and oxicam NSAID classes. Sample of duexis given and advised if she wants to continue with Pepcid separately from OTC, she can. \par -MRI ankle reviewed, fluid in ankle joint, but no report of synovitis\par - Reviewed potential side effects of MTX which include but are not limited to elevated liver enzymes with liver damage, cytopenias/decreased blood counts, pulmonary toxicity, allergic reaction, oral sores, alopecia, birth defects.\par -cont subQ MTX 15mg q week.  Has not had steady weekly dosing due to recent infections and plan for covid vaccine.   \par -Patient is aware to hold MTX if develops infection. Patient is aware to avoid alcohol while on this medication. \par -Rx folc acid 1mg daily\par -Baseline CXR and Hep B/C screening were negative\par \par OA knees/knee pain\par -IA depomedrol 80mg b/L was not all that helpful. \par -Orthopedic eval for meniscal tear, partially ruptured cyst. She is currently having HA injections b/L. \par -Posterior knee pain? tendonitis. \par \par Right PAB\par -80mg depomedrol injection given previously\par \par Back pain- improved with med\par -Rx tizanidine 2mg QHS for back pain.  Can take additional dose in AM\par \par f/u 6 weeks with labs, vectra was 40\par Counseled on MTX and covid vaccine. \par 
none

## 2021-03-24 ENCOUNTER — RX RENEWAL (OUTPATIENT)
Age: 66
End: 2021-03-24

## 2021-04-12 ENCOUNTER — APPOINTMENT (OUTPATIENT)
Dept: DISASTER EMERGENCY | Facility: OTHER | Age: 66
End: 2021-04-12
Payer: MEDICAID

## 2021-04-12 PROCEDURE — 0002A: CPT

## 2021-05-04 ENCOUNTER — RX RENEWAL (OUTPATIENT)
Age: 66
End: 2021-05-04

## 2021-05-07 ENCOUNTER — NON-APPOINTMENT (OUTPATIENT)
Age: 66
End: 2021-05-07

## 2021-05-07 ENCOUNTER — APPOINTMENT (OUTPATIENT)
Dept: RHEUMATOLOGY | Facility: CLINIC | Age: 66
End: 2021-05-07
Payer: MEDICAID

## 2021-05-07 VITALS
OXYGEN SATURATION: 98 % | SYSTOLIC BLOOD PRESSURE: 134 MMHG | DIASTOLIC BLOOD PRESSURE: 77 MMHG | BODY MASS INDEX: 36.81 KG/M2 | WEIGHT: 235 LBS | TEMPERATURE: 97.6 F | HEART RATE: 68 BPM

## 2021-05-07 PROCEDURE — 99072 ADDL SUPL MATRL&STAF TM PHE: CPT

## 2021-05-07 PROCEDURE — 99214 OFFICE O/P EST MOD 30 MIN: CPT

## 2021-05-07 NOTE — HISTORY OF PRESENT ILLNESS
[FreeTextEntry1] : This is a 66 y/o woman with hx of venous insufficiency, OA, infiltrative basal cell carcinoma 11/2020, right ruptured achilles tendon, and chronic back pain here for f/u of seronegative RA.\par \par She started MTX 8/2020 and has felt improvement. Completed a course of prednisone. \par Denies any issues with MTX, no oral sores, rashes, hair loss, abdominal pain, cough, sob. \par \par She has done 4 total doses of injection MTX 15mg. \par \par \par \par Pain level is 6/10 in intensity with 5 minutes of AM stiffness. Her biggest issue is her left knee, particularly the back of it.  She finds it hard to position the knee at nigth.  She notices they are more swollen at night.  \par She has been using naproxen 2 months at 500mg BID, rx'd to her by another provider.\par \par She finished gel shots for knees. \par Her orthopedic thinks it's coming from her back. \par \par MRI right knee shows 11/13/20 meniscal tear, partial rupture of para meniscal cyst. Small joint effusion, chondromalacia. \par \par She is going to have vein stripping\par She is seeing cardiologist to check on her SOB and fatigue.  It is now resolved.  She did have 2D ECHO.\par \par The tizanidine has been helpful, once in a while.\par \par Prior hx\par She developed right hand pain 1 year ago, mainly in [facility].\par She saw Dr. Sinha who told her she had some sort of arthritis for which he Rx'd her prednisone. \par She has since developed pain in b/L knees, b/L feet, b/L shoulders\par The prednisone helped maybe 20%. She now reports that after the second 3 week course of prednisone, she did feel better. \par Her stiffness is 10-20 minutes\par She hasn't had prednisone since 6/2019. \par She has been self treating with ibuprofen 800 BID which helps. Denies any abd issues. \par \par \par Had allergic reaction with mobic and voltaren. \par She had Tramadol but it made her too drowsy. \par \par

## 2021-05-07 NOTE — ASSESSMENT
[FreeTextEntry1] : Plan\par \par -XR hands and SI joints were unremarkable \par -labs reviewed, neg RF/CCP, Elevated ESR/CRP\par -She prefers the naproxen 500mg BID over the ibuprofen.  Reviewed potential side effects including but not limited to GI bleeding/Ulcers, kidney/liver dysfunction, elevated BP, and association with heart attack/stroke.\par -MRI ankle reviewed, fluid in ankle joint, but no report of synovitis\par - Reviewed potential side effects of MTX which include but are not limited to elevated liver enzymes with liver damage, cytopenias/decreased blood counts, pulmonary toxicity, allergic reaction, oral sores, alopecia, birth defects.\par -cont subQ MTX 15mg q week. Has not had steady weekly dosing due to recent infections and plan for covid vaccine. \par -Patient is aware to hold MTX if develops infection. Patient is aware to avoid alcohol while on this medication. \par -Rx folc acid 1mg daily\par -Baseline CXR and Hep B/C screening were negative\par \par OA knees/knee pain\par -IA depomedrol 80mg b/L was not all that helpful. \par -Orthopedic eval for meniscal tear, partially ruptured cyst. She had HA injections b/L. \par -Posterior knee pain? tendonitis?  Baker's cyst? \par -Will order XR left knee.  If unrevealing, to f/u with MRI left knee.  \par \par Right PAB\par -80mg depomedrol injection given previously\par \par Back pain- improved with med\par -Rx tizanidine 2mg QHS for back pain. Can take additional dose in AM\par \par f/u 6 weeks with labs, vectra was 40\par

## 2021-05-14 ENCOUNTER — APPOINTMENT (OUTPATIENT)
Dept: RHEUMATOLOGY | Facility: CLINIC | Age: 66
End: 2021-05-14
Payer: MEDICAID

## 2021-05-14 DIAGNOSIS — M25.562 PAIN IN LEFT KNEE: ICD-10-CM

## 2021-05-14 PROCEDURE — 99441: CPT

## 2021-05-24 ENCOUNTER — OUTPATIENT (OUTPATIENT)
Dept: OUTPATIENT SERVICES | Facility: HOSPITAL | Age: 66
LOS: 1 days | End: 2021-05-24

## 2021-05-24 ENCOUNTER — APPOINTMENT (OUTPATIENT)
Dept: MRI IMAGING | Facility: CLINIC | Age: 66
End: 2021-05-24
Payer: MEDICAID

## 2021-05-24 DIAGNOSIS — M25.562 PAIN IN LEFT KNEE: ICD-10-CM

## 2021-05-24 PROCEDURE — 73721 MRI JNT OF LWR EXTRE W/O DYE: CPT | Mod: 26,LT

## 2021-06-07 ENCOUNTER — APPOINTMENT (OUTPATIENT)
Dept: VASCULAR SURGERY | Facility: CLINIC | Age: 66
End: 2021-06-07
Payer: MEDICAID

## 2021-06-07 VITALS
DIASTOLIC BLOOD PRESSURE: 72 MMHG | HEART RATE: 66 BPM | TEMPERATURE: 97.2 F | HEIGHT: 67 IN | OXYGEN SATURATION: 99 % | BODY MASS INDEX: 38.14 KG/M2 | WEIGHT: 243 LBS | SYSTOLIC BLOOD PRESSURE: 126 MMHG

## 2021-06-07 DIAGNOSIS — Z98.890 OTHER SPECIFIED POSTPROCEDURAL STATES: ICD-10-CM

## 2021-06-07 PROCEDURE — 99072 ADDL SUPL MATRL&STAF TM PHE: CPT

## 2021-06-07 PROCEDURE — 99212 OFFICE O/P EST SF 10 MIN: CPT

## 2021-07-01 ENCOUNTER — APPOINTMENT (OUTPATIENT)
Dept: RHEUMATOLOGY | Facility: CLINIC | Age: 66
End: 2021-07-01
Payer: MEDICAID

## 2021-07-01 VITALS
HEART RATE: 67 BPM | BODY MASS INDEX: 37.28 KG/M2 | SYSTOLIC BLOOD PRESSURE: 111 MMHG | WEIGHT: 238 LBS | OXYGEN SATURATION: 97 % | TEMPERATURE: 97.8 F | DIASTOLIC BLOOD PRESSURE: 66 MMHG

## 2021-07-01 PROCEDURE — 99072 ADDL SUPL MATRL&STAF TM PHE: CPT

## 2021-07-01 PROCEDURE — 99214 OFFICE O/P EST MOD 30 MIN: CPT

## 2021-07-01 NOTE — HISTORY OF PRESENT ILLNESS
[FreeTextEntry1] : This is a 66 y/o woman with hx of venous insufficiency, OA, infiltrative basal cell carcinoma 11/2020, right ruptured achilles tendon, and chronic back pain here for f/u of seronegative RA.\par \par \par She started MTX 8/2020 and has felt improvement. Completed a course of prednisone. \par Denies any issues with MTX, no oral sores, rashes, hair loss, abdominal pain, cough, sob. \par She was switched to MTX 15mg subQ.\par  \par \par Pain level is 4/10 in intensity with 5 minutes of AM stiffness. Her biggest issue is \par her left knee is still her biggest issue\par Wakes her up at night\par MRI left knee shows complex tear meniscus, tendinosis, ganglion cyst, small joint effusion with synoviits, baekr's cyst.  \par She finished gel shots for knees. \par Her orthopedic thinks it's coming from her back, but now she is not so sure.  \par \par MRI right knee shows 11/13/20 meniscal tear, partial rupture of para meniscal cyst. Small joint effusion, chondromalacia. \par \par She has vein ablation 7/27/21.\par \par She is seeing cardiologist to check on her SOB and fatigue. It is now resolved. She did have 2D ECHO.\par \par The tizanidine has been helpful, once in a while.\par \par Prior hx\par She developed right hand pain 1 year ago, mainly in [facility].\par She saw Dr. Sinha who told her she had some sort of arthritis for which he Rx'd her prednisone. \par She has since developed pain in b/L knees, b/L feet, b/L shoulders\par The prednisone helped maybe 20%. She now reports that after the second 3 week course of prednisone, she did feel better. \par Her stiffness is 10-20 minutes\par She hasn't had prednisone since 6/2019. \par She has been self treating with ibuprofen 800 BID which helps. Denies any abd issues. \par \par \par Had allergic reaction with mobic and voltaren. \par She had Tramadol but it made her too drowsy. \par \par

## 2021-07-01 NOTE — ASSESSMENT
[FreeTextEntry1] : Inc MTX to 0.7\par labs 4 weeks\par f/u 6 weeks\par \par Plan\par \par -XR hands and SI joints were unremarkable \par -labs reviewed, neg RF/CCP, Elevated ESR/CRP\par -Decr naproxen to 375mg daily PRN only. Reviewed potential side effects including but not limited to GI bleeding/Ulcers, kidney/liver dysfunction, elevated BP, and association with heart attack/stroke.\par -MRI ankle reviewed, fluid in ankle joint, but no report of synovitis\par - Reviewed potential side effects of MTX which include but are not limited to elevated liver enzymes with liver damage, cytopenias/decreased blood counts, pulmonary toxicity, allergic reaction, oral sores, alopecia, birth defects.\par -inc subQ MTX 17.5mg q week. Has not had steady weekly dosing due to recent infections and plan for covid vaccine. \par -Patient is aware to hold MTX if develops infection. Patient is aware to avoid alcohol while on this medication. \par -Rx folc acid 1mg daily\par -Baseline CXR and Hep B/C screening were negative\par \par OA knees/knee pain\par -IA depomedrol 80mg b/L was not all that helpful. \par -Orthopedic eval for meniscal tear, partially ruptured cyst. She had HA injections b/L. \par -Posterior knee pain? tendonitis? Baker's cyst? \par -MRI left knee as above.\par \par Right PAB- not active\par -80mg depomedrol injection given previously\par \par Back pain- improved with med\par -Rx tizanidine 2mg QHS for back pain. Can take additional dose in AM\par \par f/u 6 weeks with labs, vectra was 40\par . \par \par

## 2021-07-06 ENCOUNTER — RX RENEWAL (OUTPATIENT)
Age: 66
End: 2021-07-06

## 2021-07-16 ENCOUNTER — RX RENEWAL (OUTPATIENT)
Age: 66
End: 2021-07-16

## 2021-07-27 ENCOUNTER — APPOINTMENT (OUTPATIENT)
Dept: VASCULAR SURGERY | Facility: CLINIC | Age: 66
End: 2021-07-27
Payer: MEDICAID

## 2021-07-27 VITALS
SYSTOLIC BLOOD PRESSURE: 123 MMHG | BODY MASS INDEX: 37.35 KG/M2 | HEIGHT: 67 IN | HEART RATE: 64 BPM | DIASTOLIC BLOOD PRESSURE: 74 MMHG | WEIGHT: 238 LBS | TEMPERATURE: 97.7 F | OXYGEN SATURATION: 97 %

## 2021-07-27 DIAGNOSIS — I87.2 VENOUS INSUFFICIENCY (CHRONIC) (PERIPHERAL): ICD-10-CM

## 2021-07-27 PROCEDURE — 36475 ENDOVENOUS RF 1ST VEIN: CPT | Mod: LT

## 2021-07-27 PROCEDURE — 99072 ADDL SUPL MATRL&STAF TM PHE: CPT

## 2021-07-29 ENCOUNTER — APPOINTMENT (OUTPATIENT)
Dept: VASCULAR SURGERY | Facility: CLINIC | Age: 66
End: 2021-07-29

## 2021-08-16 ENCOUNTER — APPOINTMENT (OUTPATIENT)
Dept: VASCULAR SURGERY | Facility: CLINIC | Age: 66
End: 2021-08-16
Payer: MEDICAID

## 2021-08-16 PROCEDURE — 99213 OFFICE O/P EST LOW 20 MIN: CPT

## 2021-08-19 ENCOUNTER — RESULT REVIEW (OUTPATIENT)
Age: 66
End: 2021-08-19

## 2021-08-19 ENCOUNTER — APPOINTMENT (OUTPATIENT)
Dept: RHEUMATOLOGY | Facility: CLINIC | Age: 66
End: 2021-08-19
Payer: MEDICARE

## 2021-08-19 VITALS
HEIGHT: 67 IN | OXYGEN SATURATION: 95 % | BODY MASS INDEX: 37.35 KG/M2 | TEMPERATURE: 96.8 F | WEIGHT: 238 LBS | HEART RATE: 69 BPM | DIASTOLIC BLOOD PRESSURE: 68 MMHG | SYSTOLIC BLOOD PRESSURE: 111 MMHG

## 2021-08-19 PROCEDURE — 99214 OFFICE O/P EST MOD 30 MIN: CPT

## 2021-08-19 RX ORDER — CYCLOBENZAPRINE HYDROCHLORIDE 5 MG/1
5 TABLET, FILM COATED ORAL
Qty: 60 | Refills: 0 | Status: DISCONTINUED | COMMUNITY
Start: 2021-06-25

## 2021-08-19 RX ORDER — CYCLOBENZAPRINE HYDROCHLORIDE 10 MG/1
10 TABLET, FILM COATED ORAL
Qty: 30 | Refills: 0 | Status: DISCONTINUED | COMMUNITY
Start: 2021-03-22

## 2021-08-19 NOTE — ASSESSMENT
[FreeTextEntry1] : \par \par Plan\par -Plan to start humira after her vascular and dental surgeries\par Risks and safety considerations were discussed including but not limited to decreased ability to fight infections, malignancy, nerve disease, new or worsening heart failure, lupus like syndrome, hepatitis B or tuberculosis reactivation, decrease in blood cell counts, hypersensitivity, injection site reactions.\par -XR hands and SI joints were unremarkable.  MRI pelvis ordered.\par -labs reviewed, neg RF/CCP, Elevated ESR/CRP\par -Decr naproxen to 375mg daily PRN only. Reviewed potential side effects including but not limited to GI bleeding/Ulcers, kidney/liver dysfunction, elevated BP, and association with heart attack/stroke.\par -MRI ankle reviewed, fluid in ankle joint, but no report of synovitis\par - Reviewed potential side effects of MTX which include but are not limited to elevated liver enzymes with liver damage, cytopenias/decreased blood counts, pulmonary toxicity, allergic reaction, oral sores, alopecia, birth defects.\par -inc subQ MTX 20mg q week for now. \par -Patient is aware to hold MTX if develops infection. Patient is aware to avoid alcohol while on this medication. \par -Rx folc acid 1mg daily\par -Baseline CXR and Hep B/C screening were negative\par \par \par poison ivy right arm\par -pred 10 days\par \par OA knees/knee pain\par -IA depomedrol 80mg b/L was not all that helpful. \par -Orthopedic eval for meniscal tear, partially ruptured cyst. She had HA injections b/L. \par -Posterior knee pain? tendonitis? Baker's cyst? \par -MRI left knee as above.\par \par Right PAB- not active\par -80mg depomedrol injection given previously\par \par Back pain- improved with med\par -Rx tizanidine 2mg QHS for back pain. Can take additional dose in AM\par \par \par \par f/u 11/4/21\par labs 8/28/21 including quant covid abs\par  vectra was 40\par . \par \par

## 2021-08-19 NOTE — HISTORY OF PRESENT ILLNESS
[FreeTextEntry1] : This is a 64 y/o woman with hx of venous insufficiency, OA, infiltrative basal cell carcinoma 11/2020, right ruptured achilles tendon, and chronic back pain here for f/u of seronegative RA.\par \par She started MTX 8/2020 and has felt improvement. Completed a course of prednisone. \par Denies any issues with MTX, no oral sores, rashes, hair loss, abdominal pain, cough, sob. \par She was switched to MTX subQ now on 17.5mg subQ.\par \par She is having vein stripping planning for 9/2021.  \par  \par \par MRI left knee shows complex tear meniscus, tendinosis, ganglion cyst, small joint effusion with synovits, baker's cyst. \par She finished gel shots for knees. \par Her orthopedic thinks it's coming from her back, but now she is not so sure. \par \par MRI right knee shows 11/13/20 meniscal tear, partial rupture of para meniscal cyst. Small joint effusion, chondromalacia. \par \par She has vein ablation 7/27/21.\par \par She saw a cardiologist to check on her SOB and fatigue. It is now resolved. She did have 2D ECHO.\par \par The tizanidine has been helpful, once in a while.\par \par Prior hx\par She developed right hand pain 1 year ago, mainly in [facility].\par She saw Dr. Sinha who told her she had some sort of arthritis for which he Rx'd her prednisone. \par She has since developed pain in b/L knees, b/L feet, b/L shoulders\par The prednisone helped maybe 20%. She now reports that after the second 3 week course of prednisone, she did feel better. \par Her stiffness is 10-20 minutes\par She hasn't had prednisone since 6/2019. \par She has been self treating with ibuprofen 800 BID which helps. Denies any abd issues. \par \par \par Had allergic reaction with mobic and voltaren. \par She had Tramadol but it made her too drowsy. \par

## 2021-09-01 ENCOUNTER — APPOINTMENT (OUTPATIENT)
Dept: VASCULAR SURGERY | Facility: CLINIC | Age: 66
End: 2021-09-01

## 2021-09-20 ENCOUNTER — APPOINTMENT (OUTPATIENT)
Dept: VASCULAR SURGERY | Facility: CLINIC | Age: 66
End: 2021-09-20
Payer: MEDICARE

## 2021-09-20 VITALS
OXYGEN SATURATION: 100 % | TEMPERATURE: 97.6 F | SYSTOLIC BLOOD PRESSURE: 125 MMHG | RESPIRATION RATE: 16 BRPM | WEIGHT: 240 LBS | DIASTOLIC BLOOD PRESSURE: 83 MMHG | HEIGHT: 67 IN | HEART RATE: 61 BPM | BODY MASS INDEX: 37.67 KG/M2

## 2021-09-20 PROCEDURE — 99213 OFFICE O/P EST LOW 20 MIN: CPT

## 2021-09-23 LAB
25(OH)D3 SERPL-MCNC: 42.7 NG/ML
ALBUMIN SERPL ELPH-MCNC: 4 G/DL
ALP BLD-CCNC: 97 U/L
ALT SERPL-CCNC: 16 U/L
ANION GAP SERPL CALC-SCNC: 12 MMOL/L
AST SERPL-CCNC: 17 U/L
BASOPHILS # BLD AUTO: 0.02 K/UL
BASOPHILS NFR BLD AUTO: 0.3 %
BILIRUB SERPL-MCNC: 0.2 MG/DL
BUN SERPL-MCNC: 23 MG/DL
CALCIUM SERPL-MCNC: 9.2 MG/DL
CHLORIDE SERPL-SCNC: 107 MMOL/L
CO2 SERPL-SCNC: 25 MMOL/L
COVID-19 SPIKE DOMAIN ANTIBODY INTERPRETATION: POSITIVE
CREAT SERPL-MCNC: 1.12 MG/DL
CRP SERPL-MCNC: 5 MG/L
EOSINOPHIL # BLD AUTO: 0.3 K/UL
EOSINOPHIL NFR BLD AUTO: 4.9 %
ERYTHROCYTE [SEDIMENTATION RATE] IN BLOOD BY WESTERGREN METHOD: 7 MM/HR
GLUCOSE SERPL-MCNC: 105 MG/DL
HBV CORE IGG+IGM SER QL: NONREACTIVE
HBV SURFACE AB SERPL IA-ACNC: >1000 MIU/ML
HBV SURFACE AG SER QL: NONREACTIVE
HCT VFR BLD CALC: 35.9 %
HCV AB SER QL: NONREACTIVE
HCV S/CO RATIO: 0.39 S/CO
HGB BLD-MCNC: 11.3 G/DL
IMM GRANULOCYTES NFR BLD AUTO: 0.3 %
LYMPHOCYTES # BLD AUTO: 1.52 K/UL
LYMPHOCYTES NFR BLD AUTO: 24.8 %
MAN DIFF?: NORMAL
MCHC RBC-ENTMCNC: 29.4 PG
MCHC RBC-ENTMCNC: 31.5 GM/DL
MCV RBC AUTO: 93.5 FL
MONOCYTES # BLD AUTO: 0.39 K/UL
MONOCYTES NFR BLD AUTO: 6.4 %
NEUTROPHILS # BLD AUTO: 3.89 K/UL
NEUTROPHILS NFR BLD AUTO: 63.3 %
PLATELET # BLD AUTO: 222 K/UL
POTASSIUM SERPL-SCNC: 4.3 MMOL/L
PROT SERPL-MCNC: 5.9 G/DL
RBC # BLD: 3.84 M/UL
RBC # FLD: 14.5 %
SARS-COV-2 AB SERPL IA-ACNC: >250 U/ML
SODIUM SERPL-SCNC: 144 MMOL/L
WBC # FLD AUTO: 6.14 K/UL

## 2021-09-28 LAB
M TB IFN-G BLD-IMP: NEGATIVE
QUANTIFERON TB PLUS MITOGEN MINUS NIL: 8.16 IU/ML
QUANTIFERON TB PLUS NIL: 0.02 IU/ML
QUANTIFERON TB PLUS TB1 MINUS NIL: 0 IU/ML
QUANTIFERON TB PLUS TB2 MINUS NIL: 0.03 IU/ML

## 2021-09-30 ENCOUNTER — APPOINTMENT (OUTPATIENT)
Dept: MRI IMAGING | Facility: CLINIC | Age: 66
End: 2021-09-30
Payer: MEDICARE

## 2021-09-30 ENCOUNTER — OUTPATIENT (OUTPATIENT)
Dept: OUTPATIENT SERVICES | Facility: HOSPITAL | Age: 66
LOS: 1 days | End: 2021-09-30

## 2021-09-30 ENCOUNTER — RX RENEWAL (OUTPATIENT)
Age: 66
End: 2021-09-30

## 2021-09-30 DIAGNOSIS — M54.9 DORSALGIA, UNSPECIFIED: ICD-10-CM

## 2021-09-30 PROCEDURE — 72195 MRI PELVIS W/O DYE: CPT | Mod: 26

## 2021-11-02 LAB
25(OH)D3 SERPL-MCNC: 45 NG/ML
ALBUMIN SERPL ELPH-MCNC: 4.2 G/DL
ALP BLD-CCNC: 79 U/L
ALT SERPL-CCNC: 13 U/L
ANION GAP SERPL CALC-SCNC: 11 MMOL/L
AST SERPL-CCNC: 14 U/L
BASOPHILS # BLD AUTO: 0.04 K/UL
BASOPHILS NFR BLD AUTO: 0.6 %
BILIRUB SERPL-MCNC: 0.4 MG/DL
BUN SERPL-MCNC: 19 MG/DL
CALCIUM SERPL-MCNC: 9.7 MG/DL
CHLORIDE SERPL-SCNC: 103 MMOL/L
CO2 SERPL-SCNC: 25 MMOL/L
CREAT SERPL-MCNC: 1.06 MG/DL
CRP SERPL-MCNC: 4 MG/L
EOSINOPHIL # BLD AUTO: 0.21 K/UL
EOSINOPHIL NFR BLD AUTO: 3.2 %
ERYTHROCYTE [SEDIMENTATION RATE] IN BLOOD BY WESTERGREN METHOD: 8 MM/HR
GLUCOSE SERPL-MCNC: 130 MG/DL
HCT VFR BLD CALC: 35.1 %
HGB BLD-MCNC: 11.3 G/DL
IMM GRANULOCYTES NFR BLD AUTO: 0.3 %
LYMPHOCYTES # BLD AUTO: 1.95 K/UL
LYMPHOCYTES NFR BLD AUTO: 30 %
MAN DIFF?: NORMAL
MCHC RBC-ENTMCNC: 29.4 PG
MCHC RBC-ENTMCNC: 32.2 GM/DL
MCV RBC AUTO: 91.4 FL
MONOCYTES # BLD AUTO: 0.37 K/UL
MONOCYTES NFR BLD AUTO: 5.7 %
NEUTROPHILS # BLD AUTO: 3.91 K/UL
NEUTROPHILS NFR BLD AUTO: 60.2 %
PLATELET # BLD AUTO: 232 K/UL
POTASSIUM SERPL-SCNC: 3.9 MMOL/L
PROT SERPL-MCNC: 6.3 G/DL
RBC # BLD: 3.84 M/UL
RBC # FLD: 14.1 %
SODIUM SERPL-SCNC: 140 MMOL/L
WBC # FLD AUTO: 6.5 K/UL

## 2021-11-04 ENCOUNTER — APPOINTMENT (OUTPATIENT)
Dept: RHEUMATOLOGY | Facility: CLINIC | Age: 66
End: 2021-11-04
Payer: MEDICARE

## 2021-11-04 VITALS
DIASTOLIC BLOOD PRESSURE: 74 MMHG | BODY MASS INDEX: 37.59 KG/M2 | TEMPERATURE: 97.9 F | OXYGEN SATURATION: 99 % | WEIGHT: 240 LBS | HEART RATE: 68 BPM | SYSTOLIC BLOOD PRESSURE: 122 MMHG

## 2021-11-04 DIAGNOSIS — M06.00 RHEUMATOID ARTHRITIS W/OUT RHEUMATOID FACTOR, UNSPECIFIED SITE: ICD-10-CM

## 2021-11-04 PROCEDURE — 99214 OFFICE O/P EST MOD 30 MIN: CPT

## 2021-11-04 RX ORDER — METHYLPREDNISOLONE 4 MG/1
4 TABLET ORAL
Qty: 1 | Refills: 0 | Status: DISCONTINUED | COMMUNITY
Start: 2021-03-09 | End: 2021-11-04

## 2021-11-04 RX ORDER — NAPROXEN 500 MG/1
500 TABLET ORAL
Refills: 0 | Status: DISCONTINUED | COMMUNITY
End: 2021-11-04

## 2021-11-04 RX ORDER — HYDROCHLOROTHIAZIDE 12.5 MG/1
TABLET ORAL
Refills: 0 | Status: DISCONTINUED | COMMUNITY
End: 2021-11-04

## 2021-11-04 RX ORDER — PREDNISONE 10 MG/1
10 TABLET ORAL
Qty: 17 | Refills: 0 | Status: DISCONTINUED | COMMUNITY
Start: 2021-08-19 | End: 2021-11-04

## 2021-11-04 RX ORDER — COLLAGENASE SANTYL 250 [ARB'U]/G
250 OINTMENT TOPICAL DAILY
Qty: 1 | Refills: 3 | Status: DISCONTINUED | COMMUNITY
Start: 2021-01-19 | End: 2021-11-04

## 2021-11-04 RX ORDER — GABAPENTIN 300 MG/1
300 CAPSULE ORAL 3 TIMES DAILY
Qty: 270 | Refills: 3 | Status: DISCONTINUED | COMMUNITY
Start: 2021-03-01 | End: 2021-11-04

## 2021-11-04 RX ORDER — NAPROXEN 375 MG/1
375 TABLET ORAL
Qty: 60 | Refills: 1 | Status: DISCONTINUED | COMMUNITY
Start: 2021-07-01 | End: 2021-11-04

## 2021-11-04 RX ORDER — METHOTREXATE 2.5 MG/1
2.5 TABLET ORAL
Qty: 24 | Refills: 0 | Status: DISCONTINUED | COMMUNITY
Start: 2020-08-20 | End: 2021-11-04

## 2021-11-04 RX ORDER — TRIAMCINOLONE ACETONIDE 5 MG/G
0.5 CREAM TOPICAL TWICE DAILY
Qty: 60 | Refills: 2 | Status: DISCONTINUED | COMMUNITY
Start: 2021-01-19 | End: 2021-11-04

## 2021-11-04 RX ORDER — TIZANIDINE 2 MG/1
2 TABLET ORAL
Qty: 90 | Refills: 1 | Status: DISCONTINUED | COMMUNITY
Start: 2020-10-01 | End: 2021-11-04

## 2021-11-04 RX ORDER — FLUTICASONE PROPIONATE 50 UG/1
50 SPRAY, METERED NASAL
Qty: 16 | Refills: 0 | Status: DISCONTINUED | COMMUNITY
Start: 2020-10-15 | End: 2021-11-04

## 2021-11-04 RX ORDER — SYRINGE, DISPOSABLE, 1 ML
25G X 5/8" SYRINGE, EMPTY DISPOSABLE MISCELLANEOUS
Qty: 1 | Refills: 0 | Status: ACTIVE | COMMUNITY
Start: 2021-11-04 | End: 1900-01-01

## 2021-11-04 NOTE — ASSESSMENT
[FreeTextEntry1] : Plan\par -Plan to start humira after her vascular surgeries. Dentist cleared her for use.  If it is not approved, will pursue simponi aria.  \par Risks and safety considerations were discussed including but not limited to decreased ability to fight infections, malignancy, nerve disease, new or worsening heart failure, lupus like syndrome, hepatitis B or tuberculosis reactivation, decrease in blood cell counts, hypersensitivity, injection site reactions.\par -XR hands and SI joints were unremarkable. MRI pelvis ordered.\par -labs reviewed, neg RF/CCP, Elevated ESR/CRP\par -Off naproxen\par -MRI ankle reviewed, fluid in ankle joint, but no report of synovitis\par - Reviewed potential side effects of MTX which include but are not limited to elevated liver enzymes with liver damage, cytopenias/decreased blood counts, pulmonary toxicity, allergic reaction, oral sores, alopecia, birth defects.\par -cont subQ MTX 20mg q week for now. \par -Patient is aware to hold MTX if develops infection. Patient is aware to avoid alcohol while on this medication. \par -Rx folc acid 1mg daily\par -Rx pred 10mg x 7 days while waiting to begin humira.  Repeat as needed. \par \par poison ivy right arm- resolved\par -pred course complete\par \par OA knees/knee pain\par -IA depomedrol 80mg b/L was not all that helpful. \par -Orthopedic eval for meniscal tear, partially ruptured cyst. She had HA injections b/L. \par -Posterior knee pain? tendonitis? Baker's cyst? \par -MRI left knee as above.\par \par Right PAB- not active\par -80mg depomedrol injection given previously\par \par Back pain- improved with med\par -Rx tizanidine 2mg QHS for back pain. Can take additional dose in AM\par \par Skin lesion left thigh\par -derm eval\par \par  vectra was 40\par  \par \par labs before next visit\par f/u 3 months

## 2021-11-04 NOTE — HISTORY OF PRESENT ILLNESS
[FreeTextEntry1] : This is a 67 y/o woman with hx of venous insufficiency, OA, infiltrative basal cell carcinoma 11/2020, right ruptured achilles tendon, and chronic back pain here for f/u of seronegative RA.\par \par She started MTX 8/2020 and has felt improvement. Completed a course of prednisone. \par Denies any issues with MTX, no oral sores, rashes, hair loss, abdominal pain, cough, sob. \par She was switched to MTX subQ now on 17.5mg subQ.\par We had planned humira, however she is having vein stripping surgery december 13, 2021\par \par MRI shows b/L sacroiliitis.  \par \par MRI left knee shows complex tear meniscus, tendinosis, ganglion cyst, small joint effusion with synovitis, baker's cyst. \par She finished gel shots for knees. \par Her orthopedic thinks it's coming from her back, but now she is not so sure. \par \par MRI right knee shows 11/13/20 meniscal tear, partial rupture of para meniscal cyst. Small joint effusion, chondromalacia. \par \par She has vein ablation 7/27/21.\par \par She saw a cardiologist to check on her SOB and fatigue. It is now resolved. She did have 2D ECHO.\par \par The tizanidine has been helpful, once in a while.\par \par Prior hx\par She developed right hand pain 1 year ago, mainly in [facility].\par She saw Dr. Sinha who told her she had some sort of arthritis for which he Rx'd her prednisone. \par She has since developed pain in b/L knees, b/L feet, b/L shoulders\par The prednisone helped maybe 20%. She now reports that after the second 3 week course of prednisone, she did feel better. \par Her stiffness is 10-20 minutes\par She hasn't had prednisone since 6/2019. \par She has been self treating with ibuprofen 800 BID which helps. Denies any abd issues. \par \par \par Had allergic reaction with mobic and voltaren. \par She had Tramadol but it made her too drowsy. \par \par

## 2021-11-19 ENCOUNTER — RX RENEWAL (OUTPATIENT)
Age: 66
End: 2021-11-19

## 2021-11-23 ENCOUNTER — OUTPATIENT (OUTPATIENT)
Dept: OUTPATIENT SERVICES | Facility: HOSPITAL | Age: 66
LOS: 1 days | End: 2021-11-23
Payer: MEDICARE

## 2021-11-23 VITALS
HEART RATE: 68 BPM | SYSTOLIC BLOOD PRESSURE: 115 MMHG | WEIGHT: 241.41 LBS | TEMPERATURE: 98 F | HEIGHT: 68 IN | DIASTOLIC BLOOD PRESSURE: 56 MMHG | RESPIRATION RATE: 16 BRPM

## 2021-11-23 DIAGNOSIS — Z29.9 ENCOUNTER FOR PROPHYLACTIC MEASURES, UNSPECIFIED: ICD-10-CM

## 2021-11-23 DIAGNOSIS — Z01.818 ENCOUNTER FOR OTHER PREPROCEDURAL EXAMINATION: ICD-10-CM

## 2021-11-23 DIAGNOSIS — Z98.890 OTHER SPECIFIED POSTPROCEDURAL STATES: Chronic | ICD-10-CM

## 2021-11-23 DIAGNOSIS — I83.811 VARICOSE VEINS OF RIGHT LOWER EXTREMITY WITH PAIN: ICD-10-CM

## 2021-11-23 DIAGNOSIS — I10 ESSENTIAL (PRIMARY) HYPERTENSION: ICD-10-CM

## 2021-11-23 LAB
A1C WITH ESTIMATED AVERAGE GLUCOSE RESULT: 5.2 % — SIGNIFICANT CHANGE UP (ref 4–5.6)
ANION GAP SERPL CALC-SCNC: 14 MMOL/L — SIGNIFICANT CHANGE UP (ref 5–17)
APTT BLD: 33 SEC — SIGNIFICANT CHANGE UP (ref 27.5–35.5)
BASOPHILS # BLD AUTO: 0.04 K/UL — SIGNIFICANT CHANGE UP (ref 0–0.2)
BASOPHILS NFR BLD AUTO: 0.5 % — SIGNIFICANT CHANGE UP (ref 0–2)
BUN SERPL-MCNC: 18.7 MG/DL — SIGNIFICANT CHANGE UP (ref 8–20)
CALCIUM SERPL-MCNC: 9.7 MG/DL — SIGNIFICANT CHANGE UP (ref 8.6–10.2)
CHLORIDE SERPL-SCNC: 108 MMOL/L — HIGH (ref 98–107)
CO2 SERPL-SCNC: 25 MMOL/L — SIGNIFICANT CHANGE UP (ref 22–29)
CREAT SERPL-MCNC: 0.85 MG/DL — SIGNIFICANT CHANGE UP (ref 0.5–1.3)
EOSINOPHIL # BLD AUTO: 0.27 K/UL — SIGNIFICANT CHANGE UP (ref 0–0.5)
EOSINOPHIL NFR BLD AUTO: 3.6 % — SIGNIFICANT CHANGE UP (ref 0–6)
ESTIMATED AVERAGE GLUCOSE: 103 MG/DL — SIGNIFICANT CHANGE UP (ref 68–114)
GLUCOSE SERPL-MCNC: 98 MG/DL — SIGNIFICANT CHANGE UP (ref 70–99)
HCT VFR BLD CALC: 37.3 % — SIGNIFICANT CHANGE UP (ref 34.5–45)
HGB BLD-MCNC: 12.1 G/DL — SIGNIFICANT CHANGE UP (ref 11.5–15.5)
IMM GRANULOCYTES NFR BLD AUTO: 0.3 % — SIGNIFICANT CHANGE UP (ref 0–1.5)
INR BLD: 1.04 RATIO — SIGNIFICANT CHANGE UP (ref 0.88–1.16)
LYMPHOCYTES # BLD AUTO: 1.87 K/UL — SIGNIFICANT CHANGE UP (ref 1–3.3)
LYMPHOCYTES # BLD AUTO: 24.9 % — SIGNIFICANT CHANGE UP (ref 13–44)
MCHC RBC-ENTMCNC: 29.4 PG — SIGNIFICANT CHANGE UP (ref 27–34)
MCHC RBC-ENTMCNC: 32.4 GM/DL — SIGNIFICANT CHANGE UP (ref 32–36)
MCV RBC AUTO: 90.5 FL — SIGNIFICANT CHANGE UP (ref 80–100)
MONOCYTES # BLD AUTO: 0.36 K/UL — SIGNIFICANT CHANGE UP (ref 0–0.9)
MONOCYTES NFR BLD AUTO: 4.8 % — SIGNIFICANT CHANGE UP (ref 2–14)
NEUTROPHILS # BLD AUTO: 4.95 K/UL — SIGNIFICANT CHANGE UP (ref 1.8–7.4)
NEUTROPHILS NFR BLD AUTO: 65.9 % — SIGNIFICANT CHANGE UP (ref 43–77)
PLATELET # BLD AUTO: 235 K/UL — SIGNIFICANT CHANGE UP (ref 150–400)
POTASSIUM SERPL-MCNC: 4.2 MMOL/L — SIGNIFICANT CHANGE UP (ref 3.5–5.3)
POTASSIUM SERPL-SCNC: 4.2 MMOL/L — SIGNIFICANT CHANGE UP (ref 3.5–5.3)
PROTHROM AB SERPL-ACNC: 12 SEC — SIGNIFICANT CHANGE UP (ref 10.6–13.6)
RBC # BLD: 4.12 M/UL — SIGNIFICANT CHANGE UP (ref 3.8–5.2)
RBC # FLD: 13.7 % — SIGNIFICANT CHANGE UP (ref 10.3–14.5)
SODIUM SERPL-SCNC: 147 MMOL/L — HIGH (ref 135–145)
WBC # BLD: 7.51 K/UL — SIGNIFICANT CHANGE UP (ref 3.8–10.5)
WBC # FLD AUTO: 7.51 K/UL — SIGNIFICANT CHANGE UP (ref 3.8–10.5)

## 2021-11-23 PROCEDURE — 85610 PROTHROMBIN TIME: CPT

## 2021-11-23 PROCEDURE — 80048 BASIC METABOLIC PNL TOTAL CA: CPT

## 2021-11-23 PROCEDURE — G0463: CPT

## 2021-11-23 PROCEDURE — 85730 THROMBOPLASTIN TIME PARTIAL: CPT

## 2021-11-23 PROCEDURE — 93005 ELECTROCARDIOGRAM TRACING: CPT

## 2021-11-23 PROCEDURE — 36415 COLL VENOUS BLD VENIPUNCTURE: CPT

## 2021-11-23 PROCEDURE — 85025 COMPLETE CBC W/AUTO DIFF WBC: CPT

## 2021-11-23 PROCEDURE — 93010 ELECTROCARDIOGRAM REPORT: CPT

## 2021-11-23 PROCEDURE — 83036 HEMOGLOBIN GLYCOSYLATED A1C: CPT

## 2021-11-23 RX ORDER — CEFAZOLIN SODIUM 1 G
2000 VIAL (EA) INJECTION ONCE
Refills: 0 | Status: DISCONTINUED | OUTPATIENT
Start: 2021-12-16 | End: 2021-12-30

## 2021-11-23 NOTE — H&P PST ADULT - HISTORY OF PRESENT ILLNESS
Pt 67 y/o  female  with PMHx of HTN, depression,  seen today pre-op for Stab Phlebectomies, right lower extremity for varicose veins right lower extremity and pain.  Pt 65 y/o  female  with PMHx of HTN, depression,  seen today pre-op for Stab Phlebectomies, right lower extremity for varicose veins right lower extremity and pain.      Pt 65 y/o female with PMHx of HTN, OA, RA, depression, bilateral Lower extremities varicose vein, seen today pre-op for Stab Phlebectomies, right lower extremity for varicose veins right lower extremity and pain. Pt s/p LLE GSV RFA 7/27/21 and RLE GSV RFA 12/20/21. Pt report intermittent pain and discomfort of bilateral lower extremities, right lower extremity pain is worse than the left, pain worse later in the day ,  report intermittent bulging veins to her anterior left thigh and scattered through b/l legs. Pt denies fever and chills, seen today fro a scheduled procedure on 12/13/21 with Dr. Qiana Childers

## 2021-11-23 NOTE — H&P PST ADULT - ASSESSMENT
CAPRINI VTE 2.0 SCORE [CLOT updated 2019]    AGE RELATED RISK FACTORS                                                       MOBILITY RELATED FACTORS  [ ] Age 41-60 years                                            (1 Point)                    [ ] Bed rest                                                        (1 Point)  [ x] Age: 61-74 years                                           (2 Points)                  [ ] Plaster cast                                                   (2 Points)  [ ] Age= 75 years                                              (3 Points)                    [ ] Bed bound for more than 72 hours                 (2 Points)    DISEASE RELATED RISK FACTORS                                               GENDER SPECIFIC FACTORS  [ ] Edema in the lower extremities                       (1 Point)              [ ] Pregnancy                                                     (1 Point)  [ ] Varicose veins                                               (1 Point)                     [ ] Post-partum < 6 weeks                                   (1 Point)             [ x] BMI > 25 Kg/m2                                            (1 Point)                     [ ] Hormonal therapy  or oral contraception          (1 Point)                 [ ] Sepsis (in the previous month)                        (1 Point)               [ ] History of pregnancy complications                 (1 point)  [ ] Pneumonia or serious lung disease                                               [ ] Unexplained or recurrent                     (1 Point)           (in the previous month)                               (1 Point)  [ ] Abnormal pulmonary function test                     (1 Point)                 SURGERY RELATED RISK FACTORS  [ ] Acute myocardial infarction                              (1 Point)               [ ]  Section                                             (1 Point)  [ ] Congestive heart failure (in the previous month)  (1 Point)      [ ] Minor surgery                                                  (1 Point)   [ ] Inflammatory bowel disease                             (1 Point)               [ ] Arthroscopic surgery                                        (2 Points)  [ ] Central venous access                                      (2 Points)                [x ] General surgery lasting more than 45 minutes (2 points)  [ ] Malignancy- Present or previous                   (2 Points)                [ ] Elective arthroplasty                                         (5 points)    [ ] Stroke (in the previous month)                          (5 Points)                                                                                                                                                           HEMATOLOGY RELATED FACTORS                                                 TRAUMA RELATED RISK FACTORS  [ ] Prior episodes of VTE                                     (3 Points)                [ ] Fracture of the hip, pelvis, or leg                       (5 Points)  [ ] Positive family history for VTE                         (3 Points)             [ ] Acute spinal cord injury (in the previous month)  (5 Points)  [ ] Prothrombin 72165 A                                     (3 Points)               [ ] Paralysis  (less than 1 month)                             (5 Points)  [ ] Factor V Leiden                                             (3 Points)                  [ ] Multiple Trauma within 1 month                        (5 Points)  [ ] Lupus anticoagulants                                     (3 Points)                                                           [ ] Anticardiolipin antibodies                               (3 Points)                                                       [ ] High homocysteine in the blood                      (3 Points)                                             [ ] Other congenital or acquired thrombophilia      (3 Points)                                                [ ] Heparin induced thrombocytopenia                  (3 Points)                                     Total Score [     5     ]  OPIOID RISK TOOL    DANIEL EACH BOX THAT APPLIES AND ADD TOTALS AT THE END    FAMILY HISTORY OF SUBSTANCE ABUSE                 FEMALE         MALE                                                Alcohol                             [  ]1 pt          [  ]3pts                                               Illegal Durgs                     [  ]2 pts        [  ]3pts                                               Rx Drugs                           [  ]4 pts        [  ]4 pts    PERSONAL HISTORY OF SUBSTANCE ABUSE                                                                                          Alcohol                             [  ]3 pts       [  ]3 pts                                               Illegal Drugs                     [  ]4 pts        [  ]4 pts                                               Rx Drugs                           [  ]5 pts        [  ]5 pts    AGE BETWEEN 16-45 YEARS                                      [  ]1 pt         [  ]1 pt    HISTORY OF PREADOLESCENT   SEXUAL ABUSE                                                             [  ]3 pts        [  ]0pts    PSYCHOLOGICAL DISEASE                     ADD, OCD, Bipolar, Schizophrenia        [  ]2 pts         [  ]2 pts                      Depression                                               [ x ]1 pt           [  ]1 pt           SCORING TOTAL   (add numbers and type here)              (*1**)                                     A score of 3 or lower indicated LOW risk for future opioid abuse  A score of 4 to 7 indicated moderate risk for future opioid abuse  A score of 8 or higher indicates a high risk for opioid abuse Pt 67 y/o female with PMHx of HTN, OA, RA, depression, bilateral Lower extremities varicose vein, seen today pre-op for Stab Phlebectomies, right lower extremity for varicose veins right lower extremity and pain. Pt s/p LLE GSV RFA 21 and RLE GSV RFA 21. Pt report intermittent pain and discomfort of bilateral lower extremities, right lower extremity pain is worse than the left, pain worse later in the day ,  report intermittent bulging veins to her anterior left thigh and scattered through b/l legs. Pt denies fever and chills, seen today fro a scheduled procedure on 21 with Dr. Qiana Childers. Surgery protocol reviewed with pt today. Pt to follow-up with PCP for clearance    CAPRINI VTE 2.0 SCORE [CLOT updated 2019]    AGE RELATED RISK FACTORS                                                       MOBILITY RELATED FACTORS  [ ] Age 41-60 years                                            (1 Point)                    [ ] Bed rest                                                        (1 Point)  [ x] Age: 61-74 years                                           (2 Points)                  [ ] Plaster cast                                                   (2 Points)  [ ] Age= 75 years                                              (3 Points)                    [ ] Bed bound for more than 72 hours                 (2 Points)    DISEASE RELATED RISK FACTORS                                               GENDER SPECIFIC FACTORS  [ ] Edema in the lower extremities                       (1 Point)              [ ] Pregnancy                                                     (1 Point)  [ ] Varicose veins                                               (1 Point)                     [ ] Post-partum < 6 weeks                                   (1 Point)             [ x] BMI > 25 Kg/m2                                            (1 Point)                     [ ] Hormonal therapy  or oral contraception          (1 Point)                 [ ] Sepsis (in the previous month)                        (1 Point)               [ ] History of pregnancy complications                 (1 point)  [ ] Pneumonia or serious lung disease                                               [ ] Unexplained or recurrent                     (1 Point)           (in the previous month)                               (1 Point)  [ ] Abnormal pulmonary function test                     (1 Point)                 SURGERY RELATED RISK FACTORS  [ ] Acute myocardial infarction                              (1 Point)               [ ]  Section                                             (1 Point)  [ ] Congestive heart failure (in the previous month)  (1 Point)      [ ] Minor surgery                                                  (1 Point)   [ ] Inflammatory bowel disease                             (1 Point)               [ ] Arthroscopic surgery                                        (2 Points)  [ ] Central venous access                                      (2 Points)                [x ] General surgery lasting more than 45 minutes (2 points)  [ ] Malignancy- Present or previous                   (2 Points)                [ ] Elective arthroplasty                                         (5 points)    [ ] Stroke (in the previous month)                          (5 Points)                                                                                                                                                           HEMATOLOGY RELATED FACTORS                                                 TRAUMA RELATED RISK FACTORS  [ ] Prior episodes of VTE                                     (3 Points)                [ ] Fracture of the hip, pelvis, or leg                       (5 Points)  [ ] Positive family history for VTE                         (3 Points)             [ ] Acute spinal cord injury (in the previous month)  (5 Points)  [ ] Prothrombin 71761 A                                     (3 Points)               [ ] Paralysis  (less than 1 month)                             (5 Points)  [ ] Factor V Leiden                                             (3 Points)                  [ ] Multiple Trauma within 1 month                        (5 Points)  [ ] Lupus anticoagulants                                     (3 Points)                                                           [ ] Anticardiolipin antibodies                               (3 Points)                                                       [ ] High homocysteine in the blood                      (3 Points)                                             [ ] Other congenital or acquired thrombophilia      (3 Points)                                                [ ] Heparin induced thrombocytopenia                  (3 Points)                                     Total Score [     5     ]  OPIOID RISK TOOL    DANIEL EACH BOX THAT APPLIES AND ADD TOTALS AT THE END    FAMILY HISTORY OF SUBSTANCE ABUSE                 FEMALE         MALE                                                Alcohol                             [  ]1 pt          [  ]3pts                                               Illegal Durgs                     [  ]2 pts        [  ]3pts                                               Rx Drugs                           [  ]4 pts        [  ]4 pts    PERSONAL HISTORY OF SUBSTANCE ABUSE                                                                                          Alcohol                             [  ]3 pts       [  ]3 pts                                               Illegal Drugs                     [  ]4 pts        [  ]4 pts                                               Rx Drugs                           [  ]5 pts        [  ]5 pts    AGE BETWEEN 16-45 YEARS                                      [  ]1 pt         [  ]1 pt    HISTORY OF PREADOLESCENT   SEXUAL ABUSE                                                             [  ]3 pts        [  ]0pts    PSYCHOLOGICAL DISEASE                     ADD, OCD, Bipolar, Schizophrenia        [  ]2 pts         [  ]2 pts                      Depression                                               [ x ]1 pt           [  ]1 pt           SCORING TOTAL   (add numbers and type here)              (*1**)                                     A score of 3 or lower indicated LOW risk for future opioid abuse  A score of 4 to 7 indicated moderate risk for future opioid abuse  A score of 8 or higher indicates a high risk for opioid abuse

## 2021-11-23 NOTE — H&P PST ADULT - NSICDXPASTSURGICALHX_GEN_ALL_CORE_FT
PAST SURGICAL HISTORY:  H/O removal of cyst back    S/P Mohs surgery for basal cell carcinoma 2015 face and back

## 2021-11-23 NOTE — H&P PST ADULT - NSICDXFAMILYHX_GEN_ALL_CORE_FT
FAMILY HISTORY:  Father  Still living? No  FHx: heart disease, Age at diagnosis: Age Unknown    Mother  Still living? No  FHx: hypertension, Age at diagnosis: Age Unknown    Sibling  Still living? Yes, Estimated age: Age Unknown  FH: diabetes mellitus, Age at diagnosis: Age Unknown  FHx: hypertension, Age at diagnosis: Age Unknown

## 2021-11-23 NOTE — H&P PST ADULT - NSICDXPASTMEDICALHX_GEN_ALL_CORE_FT
PAST MEDICAL HISTORY:  H/O: depression     Hypertension     Varicose veins of right lower extremity with pain      PAST MEDICAL HISTORY:  H/O: depression     Hypertension     Rheumatoid arthritis, adult     Varicose veins of right lower extremity with pain

## 2021-12-10 ENCOUNTER — APPOINTMENT (OUTPATIENT)
Dept: DISASTER EMERGENCY | Facility: CLINIC | Age: 66
End: 2021-12-10

## 2021-12-15 ENCOUNTER — TRANSCRIPTION ENCOUNTER (OUTPATIENT)
Age: 66
End: 2021-12-15

## 2021-12-16 ENCOUNTER — RESULT REVIEW (OUTPATIENT)
Age: 66
End: 2021-12-16

## 2021-12-16 ENCOUNTER — OUTPATIENT (OUTPATIENT)
Dept: OUTPATIENT SERVICES | Facility: HOSPITAL | Age: 66
LOS: 1 days | End: 2021-12-16
Payer: MEDICARE

## 2021-12-16 VITALS
TEMPERATURE: 97 F | SYSTOLIC BLOOD PRESSURE: 141 MMHG | RESPIRATION RATE: 18 BRPM | DIASTOLIC BLOOD PRESSURE: 67 MMHG | OXYGEN SATURATION: 99 % | HEART RATE: 56 BPM

## 2021-12-16 VITALS
OXYGEN SATURATION: 99 % | HEART RATE: 73 BPM | TEMPERATURE: 99 F | RESPIRATION RATE: 20 BRPM | DIASTOLIC BLOOD PRESSURE: 57 MMHG | SYSTOLIC BLOOD PRESSURE: 125 MMHG | HEIGHT: 68 IN | WEIGHT: 241.41 LBS

## 2021-12-16 DIAGNOSIS — I83.893 VARICOSE VEINS OF BILATERAL LOWER EXTREMITIES WITH OTHER COMPLICATIONS: ICD-10-CM

## 2021-12-16 DIAGNOSIS — Z98.890 OTHER SPECIFIED POSTPROCEDURAL STATES: Chronic | ICD-10-CM

## 2021-12-16 LAB
ANION GAP SERPL CALC-SCNC: 12 MMOL/L — SIGNIFICANT CHANGE UP (ref 5–17)
BUN SERPL-MCNC: 22.8 MG/DL — HIGH (ref 8–20)
CALCIUM SERPL-MCNC: 9 MG/DL — SIGNIFICANT CHANGE UP (ref 8.6–10.2)
CHLORIDE SERPL-SCNC: 108 MMOL/L — HIGH (ref 98–107)
CO2 SERPL-SCNC: 25 MMOL/L — SIGNIFICANT CHANGE UP (ref 22–29)
CREAT SERPL-MCNC: 0.91 MG/DL — SIGNIFICANT CHANGE UP (ref 0.5–1.3)
GLUCOSE SERPL-MCNC: 92 MG/DL — SIGNIFICANT CHANGE UP (ref 70–99)
POTASSIUM SERPL-MCNC: 4 MMOL/L — SIGNIFICANT CHANGE UP (ref 3.5–5.3)
POTASSIUM SERPL-SCNC: 4 MMOL/L — SIGNIFICANT CHANGE UP (ref 3.5–5.3)
SODIUM SERPL-SCNC: 145 MMOL/L — SIGNIFICANT CHANGE UP (ref 135–145)

## 2021-12-16 PROCEDURE — 88304 TISSUE EXAM BY PATHOLOGIST: CPT | Mod: 26

## 2021-12-16 PROCEDURE — 37765 STAB PHLEB VEINS XTR 10-20: CPT | Mod: 50

## 2021-12-16 PROCEDURE — 88304 TISSUE EXAM BY PATHOLOGIST: CPT

## 2021-12-16 PROCEDURE — 36415 COLL VENOUS BLD VENIPUNCTURE: CPT

## 2021-12-16 PROCEDURE — 80048 BASIC METABOLIC PNL TOTAL CA: CPT

## 2021-12-16 RX ORDER — ACETAMINOPHEN 500 MG
3 TABLET ORAL
Qty: 120 | Refills: 0
Start: 2021-12-16 | End: 2021-12-25

## 2021-12-16 RX ORDER — ACETAMINOPHEN 500 MG
975 TABLET ORAL ONCE
Refills: 0 | Status: COMPLETED | OUTPATIENT
Start: 2021-12-16 | End: 2021-12-16

## 2021-12-16 RX ORDER — SODIUM CHLORIDE 9 MG/ML
1000 INJECTION, SOLUTION INTRAVENOUS
Refills: 0 | Status: DISCONTINUED | OUTPATIENT
Start: 2021-12-16 | End: 2021-12-17

## 2021-12-16 RX ORDER — SODIUM CHLORIDE 9 MG/ML
3 INJECTION INTRAMUSCULAR; INTRAVENOUS; SUBCUTANEOUS ONCE
Refills: 0 | Status: COMPLETED | OUTPATIENT
Start: 2021-12-16 | End: 2021-12-16

## 2021-12-16 RX ADMIN — SODIUM CHLORIDE 3 MILLILITER(S): 9 INJECTION INTRAMUSCULAR; INTRAVENOUS; SUBCUTANEOUS at 11:44

## 2021-12-16 RX ADMIN — Medication 975 MILLIGRAM(S): at 11:53

## 2021-12-16 NOTE — ASU DISCHARGE PLAN (ADULT/PEDIATRIC) - NS MD DC FALL RISK RISK
For information on Fall & Injury Prevention, visit: [email or web address] OR  [email or web address] OR  https://www.cdc.gov/steadi/patient.html

## 2021-12-16 NOTE — ASU DISCHARGE PLAN (ADULT/PEDIATRIC) - CARE PROVIDER_API CALL
Jose Hyatt (MD)  Surgery; Vascular Surgery  [address] [facility] [address]st [place] [address]  Phone: [phone]  Fax: [phone]  Established Patient  Follow Up Time: Routine

## 2021-12-16 NOTE — BRIEF OPERATIVE NOTE - NSICDXBRIEFPROCEDURE_GEN_ALL_CORE_FT
PROCEDURES:  Stab phlebectomy, varicose vein, 10 to 20 stab incisions 16-Dec-2021 16:26:08 Bilateral. Carl Salmeron

## 2021-12-28 PROBLEM — I10 ESSENTIAL (PRIMARY) HYPERTENSION: Chronic | Status: ACTIVE | Noted: 2021-11-23

## 2021-12-28 PROBLEM — M06.9 RHEUMATOID ARTHRITIS, UNSPECIFIED: Chronic | Status: ACTIVE | Noted: 2021-11-23

## 2021-12-28 PROBLEM — Z86.59 PERSONAL HISTORY OF OTHER MENTAL AND BEHAVIORAL DISORDERS: Chronic | Status: ACTIVE | Noted: 2021-11-23

## 2021-12-28 PROBLEM — I83.811 VARICOSE VEINS OF RIGHT LOWER EXTREMITY WITH PAIN: Chronic | Status: ACTIVE | Noted: 2021-11-23

## 2021-12-30 LAB — SURGICAL PATHOLOGY STUDY: SIGNIFICANT CHANGE UP

## 2022-01-10 ENCOUNTER — APPOINTMENT (OUTPATIENT)
Dept: VASCULAR SURGERY | Facility: CLINIC | Age: 67
End: 2022-01-10
Payer: MEDICARE

## 2022-01-10 VITALS
TEMPERATURE: 97.3 F | DIASTOLIC BLOOD PRESSURE: 71 MMHG | WEIGHT: 240 LBS | HEART RATE: 69 BPM | BODY MASS INDEX: 37.67 KG/M2 | RESPIRATION RATE: 16 BRPM | OXYGEN SATURATION: 98 % | SYSTOLIC BLOOD PRESSURE: 131 MMHG | HEIGHT: 67 IN

## 2022-01-10 DIAGNOSIS — I87.2 VENOUS INSUFFICIENCY (CHRONIC) (PERIPHERAL): ICD-10-CM

## 2022-01-10 PROCEDURE — 99024 POSTOP FOLLOW-UP VISIT: CPT

## 2022-02-15 ENCOUNTER — APPOINTMENT (OUTPATIENT)
Dept: RHEUMATOLOGY | Facility: CLINIC | Age: 67
End: 2022-02-15
Payer: MEDICARE

## 2022-02-15 VITALS
WEIGHT: 237 LBS | OXYGEN SATURATION: 97 % | BODY MASS INDEX: 37.2 KG/M2 | DIASTOLIC BLOOD PRESSURE: 60 MMHG | SYSTOLIC BLOOD PRESSURE: 100 MMHG | HEIGHT: 67 IN | HEART RATE: 64 BPM | TEMPERATURE: 97.5 F

## 2022-02-15 DIAGNOSIS — R79.82 ELEVATED C-REACTIVE PROTEIN (CRP): ICD-10-CM

## 2022-02-15 PROCEDURE — 99214 OFFICE O/P EST MOD 30 MIN: CPT

## 2022-02-15 NOTE — ASSESSMENT
[FreeTextEntry1] : -Continue humira subQ. Tolerating well.  \par - MRI pelvis shows sacroiliitis.  XR hands, unremarkable.  \par -labs reviewed, neg RF/CCP, Elevated ESR/CRP\par -Off naproxen\par -cont subQ MTX 20mg q week for now. \par -Patient is aware to hold MTX if develops infection. Patient is aware to avoid alcohol while on this medication. \par -Rx folc acid 1mg daily\par -She has active Rx for pred 10mg x 7 days while waiting to begin humira. Repeat as needed. She has only used it once.  \par \par poison ivy right arm- resolved\par -pred course complete\par \par OA knees/knee pain\par -IA depomedrol 80mg b/L was not all that helpful. \par -Orthopedic eval for meniscal tear, partially ruptured cyst. She had HA injections b/L. \par -Posterior knee pain? tendonitis? Baker's cyst? \par -MRI left knee as above.\par \par Right PAB- not active\par -80mg depomedrol injection given previously\par \par Back pain- improved with med\par -has not used the Rx tizanidine 2mg QHS for back pain. To use if needed in the future.  \par \par f/u 2-3 months, labs before next visit

## 2022-03-29 PROBLEM — I87.2 VENOUS INSUFFICIENCY OF BOTH LOWER EXTREMITIES: Status: ACTIVE | Noted: 2020-11-09

## 2022-03-29 NOTE — PHYSICAL EXAM
[2+] : left 2+ [Varicose Veins Of Lower Extremities] : bilaterally [Ankle Swelling Bilaterally] : severe [] : present [Ankle Swelling On The Right] : mild [Alert] : alert [Oriented to Person] : oriented to person [Oriented to Place] : oriented to place [Oriented to Time] : oriented to time [Calm] : calm [de-identified] : WD, WN, NAD. Awake, alert, interactive. Ambulates without difficulty [de-identified] : BETTY, PERJORDYNL [de-identified] : supple [de-identified] : non-labored [FreeTextEntry1] : Numerous tortuous varicose veins from thighs through feet. \par No edema, erythema, drainage, pain or tenderness. \par BLE mild hyperpigmentation and skin changes. [de-identified] : no cyanosis or deformity. full ROM, MS 5/5. Tenderness to left piriformis region causing 5/10 pain.\par  [de-identified] : NAZARIO

## 2022-03-29 NOTE — ASSESSMENT
[FreeTextEntry1] : 67 y/o female s/p LLE GSV RFA 7/27/21 and RLE GSV RFA 12/20/21  and secondary lymphedema.  She will continue with conservative measures: compression stockings daily from awakening until bedtime, elevating legs at rest, ambulating as frequently as possible and use lymphedema pumps daily. She will continue taking Gabapentin 300 mg BID for neuropathic pain since it has been effective.\par \par \par Plan:\par Continue Gabapentin 300 mg BID for neuropathic pain.\par She may apply Triamcinolone cream scantly to hyperpigmented areas and apply a moisturizer daily.\par Continue to utilize conservative measures: prescription stockings from awakening until that time, frequent ambulation, leg elevation at rest and lymphedema pumps.\par RTC PRN if leg pain, leg swelling, or varicose veins return \par \par A total of 30 minutes was spent with patient and coordinating care\par

## 2022-03-29 NOTE — DATA REVIEWED
[FreeTextEntry1] : U/S Venous Duplex 11/10/20 demonstrates RLE- no DVT or SVT. There is reflux of ASV where it connects to GSV with reflux measuring 3.2 - 6.8 mm. Posterior Giacomini demonstrate incompetent tributaries in thigh measuring 3.2-3.5 mm. LLE with +GSV reflux from thigh through calf and a tortuous ASV in thigh measuring 3.3 to 4.8 mm.\par U/S Venous Duplex RLE 12/29/20 was negative for DVT, SVT. GSV closed s/p RFA. \par U/S Venous Duplex 7/27/21 demonstrates a closed LLE GSV RFA from SFJ to knee s/p RFA.\par U/S Venous Duplex 7/29/21 demonstrates a closed LLE GSV RFA from SFJ to knee s/p RFA.

## 2022-03-29 NOTE — HISTORY OF PRESENT ILLNESS
[FreeTextEntry1] : 9/20/21: 65 y/o female s/p LLE GSV RFA 7/27/21 and RLE GSV RFA 12/20/21.  She was also approved for a left ASV RFA and right Giacomini stab phlebectomy.  She is feeling well, no complaints referred.  There is no bruising, swelling or pain.  She has been walking and elevating her legs at rest.  She would like to schedule left lower extremity GSV RFA and stab phlebectomies of the right Giacomini  vein.  She states she is also bothered by the bulging veins to her anterior left thigh and scattered through b/l legs.  She states they will bulge later in the day and cause pain.  No current pain reported.  No fever or chills.  She is a non-smoker.\par \par 1/10/22: Pt doing well since last visit. She has less leg swelling and pain since her procedures. She denies any severe leg pain, fever or chills. She has been compliant with compression stockings. \par \par PSHx:\par 12/16/21 LLE phlebectomy \par 7/27/21 LLE GSV RFA \par 12/20/21 RLE GSV RFA

## 2022-04-25 ENCOUNTER — APPOINTMENT (OUTPATIENT)
Dept: VASCULAR SURGERY | Facility: CLINIC | Age: 67
End: 2022-04-25
Payer: MEDICARE

## 2022-04-25 VITALS
HEIGHT: 67 IN | HEART RATE: 67 BPM | DIASTOLIC BLOOD PRESSURE: 76 MMHG | TEMPERATURE: 97.9 F | OXYGEN SATURATION: 96 % | SYSTOLIC BLOOD PRESSURE: 145 MMHG | BODY MASS INDEX: 37.51 KG/M2 | WEIGHT: 239 LBS | RESPIRATION RATE: 16 BRPM

## 2022-04-25 PROCEDURE — 93970 EXTREMITY STUDY: CPT

## 2022-04-25 PROCEDURE — 99213 OFFICE O/P EST LOW 20 MIN: CPT

## 2022-04-25 RX ORDER — PREDNISONE 10 MG/1
10 TABLET ORAL
Qty: 21 | Refills: 0 | Status: DISCONTINUED | COMMUNITY
Start: 2021-11-04 | End: 2022-04-25

## 2022-04-25 NOTE — ASSESSMENT
[FreeTextEntry1] : 67 y/o female with painful and itchy reticular veins on left leg. Failed conservative management with compression stockings. She is also s/p LLE GSV RFA 7/27/21 and RLE GSV RFA 12/20/21. \par \par Continue to utilize conservative measures: prescription stockings from awakening until that time, frequent ambulation, leg elevation at rest and lymphedema pumps.\par Plan for LLE sclerotherapy. The risks and benefits of the surgical procedure were discussed with patient, all questions were answered.\par \par A total of 30 minutes was spent with patient and coordinating care\par

## 2022-04-25 NOTE — PROCEDURE
[FreeTextEntry1] : 7/29/21 RLE venous duplex: GSV closed. No DVT \par \par 4/25/22 BLE venous duplex: \par right: GSV not visualized. No DVT \par left: GSV not visualized. AASV reflux SFJ to mid thigh. SSV tribs noted in proximal calf. No DVT

## 2022-04-25 NOTE — PHYSICAL EXAM
[2+] : left 2+ [Varicose Veins Of Lower Extremities] : bilaterally [Ankle Swelling Bilaterally] : severe [] : present [Ankle Swelling On The Right] : mild [Alert] : alert [Oriented to Person] : oriented to person [Oriented to Place] : oriented to place [Oriented to Time] : oriented to time [Calm] : calm [de-identified] : WD, WN, NAD. Awake, alert, interactive. Ambulates without difficulty [de-identified] : non-labored [FreeTextEntry1] : Numerous reticular veins in left thigh and ankle \par No edema, erythema, drainage, pain or tenderness. \par BLE mild hyperpigmentation and skin changes. [de-identified] : no cyanosis or deformity. full ROM, MS 5/5. Tenderness to left piriformis region causing 5/10 pain.\par  [de-identified] : NAZARIO

## 2022-04-25 NOTE — HISTORY OF PRESENT ILLNESS
[FreeTextEntry1] : 9/20/21: 65 y/o female s/p LLE GSV RFA 7/27/21 and RLE GSV RFA 12/20/21.  She was also approved for a left ASV RFA and right Giacomini stab phlebectomy.  She is feeling well, no complaints referred.  There is no bruising, swelling or pain.  She has been walking and elevating her legs at rest.  She would like to schedule left lower extremity GSV RFA and stab phlebectomies of the right Giacomini  vein.  She states she is also bothered by the bulging veins to her anterior left thigh and scattered through b/l legs.  She states they will bulge later in the day and cause pain.  No current pain reported.  No fever or chills.  She is a non-smoker.\par \par 1/10/22: Pt doing well since last visit. She has less leg swelling and pain since her procedures. She denies any severe leg pain, fever or chills. She has been compliant with compression stockings. \par \par 4/25/22: Pt has been having left posterior calf to mid thigh pain. Pt was told by the orthopedist to see pain management because he feels the pain may be due to spinal stenosis. Pt feels her legs bilaterally feel achy and sore and they get fatigued very easily. Pt has arthritis in her knees and was told she needs a left knee replacement. Pt has been taking Mobic 15 mg  and ibuprofen for the pain without improvement in the pain. Pt does not take gabapentin because she did not get any improvement in her pain. Pt has itchy reticular and spider veins in her left thigh and ankle. She has been compliant with 20-30 mmHg compression stockings for over 3 months without significant improvement in pain. \par \par PSHx:\par 12/16/21 LLE phlebectomy \par 7/27/21 LLE GSV RFA \par 12/20/21 RLE GSV RFA

## 2022-04-28 ENCOUNTER — APPOINTMENT (OUTPATIENT)
Dept: RHEUMATOLOGY | Facility: CLINIC | Age: 67
End: 2022-04-28
Payer: MEDICARE

## 2022-04-28 VITALS
TEMPERATURE: 97.9 F | BODY MASS INDEX: 36.22 KG/M2 | HEIGHT: 68 IN | DIASTOLIC BLOOD PRESSURE: 60 MMHG | SYSTOLIC BLOOD PRESSURE: 120 MMHG | HEART RATE: 62 BPM | OXYGEN SATURATION: 98 % | WEIGHT: 239 LBS

## 2022-04-28 PROCEDURE — 99214 OFFICE O/P EST MOD 30 MIN: CPT

## 2022-04-28 NOTE — HISTORY OF PRESENT ILLNESS
[FreeTextEntry1] : This is a 65 y/o woman with hx of venous insufficiency, OA, infiltrative basal cell carcinoma 11/2020, right ruptured achilles tendon, and chronic back pain here for f/u of seronegative RA.\par \par She had covid 12/27/2021. \par She had her booster already.\par Had symptoms of headache, rash, and GI symptoms. \par \par She started MTX 8/2020 and has felt improvement. Completed a course of prednisone. \par Denies any issues with MTX, no oral sores, rashes, hair loss, abdominal pain, cough, sob. \par She was switched to MTX subQ now on 20mg subQ.\par \par She started Humira, tolerating it well.  \par \par Nevus left upper chest\par \par Left knee continues to be her worse area.  Pain level is 8/10 in intensity\par She is on naproxen 375mg and baclofen. \par Mobic didn't help. \par She knows she needs a left knee replacment.  Planning to see Dr. Grewal.  If her left knee was taken care of, she reports her pain level overall would likely be a 3-4/10.  \par No significant issue in hands.  \par \par She had vein surgery 12/16/2021, but that is finished and she started the humira. \par MRI shows b/L sacroiliitis. \par \par MRI left knee shows complex tear meniscus, tendinosis, ganglion cyst, small joint effusion with synovitis, baker's cyst. \par She finished gel shots for knees. \par Her orthopedic thinks it's coming from her back, but now she is not so sure. \par \par MRI right knee shows 11/13/20 meniscal tear, partial rupture of para meniscal cyst. Small joint effusion, chondromalacia. \par \par She saw a cardiologist to check on her SOB and fatigue. It is now resolved. She did have 2D ECHO.\par \par The tizanidine has been helpful, once in a while. She was given baclofen from another doctor.  \par She is seeing Dr. Godoy in [place].  \par \par Prior hx\par She developed right hand pain 1 year ago, mainly in [facility].\par She saw Dr. Sinha who told her she had some sort of arthritis for which he Rx'd her prednisone. \par She has since developed pain in b/L knees, b/L feet, b/L shoulders\par The prednisone helped maybe 20%. She now reports that after the second 3 week course of prednisone, she did feel better. \par Her stiffness is 10-20 minutes\par She hasn't had prednisone since 6/2019. \par She has been self treating with ibuprofen 800 BID which helps. Denies any abd issues. \par

## 2022-04-28 NOTE — ASSESSMENT
[FreeTextEntry1] : -Continue humira subQ. Tolerating well. \par - MRI pelvis shows sacroiliitis. XR hands, unremarkable. \par -labs reviewed, neg RF/CCP, Elevated ESR/CRP\par -Off naproxen\par -cont subQ MTX 20mg q week for now. \par -Patient is aware to hold MTX if develops infection. Patient is aware to avoid alcohol while on this medication. \par -Rx folc acid 1mg daily\par -She has active Rx for pred 10mg x 7 days while waiting to begin humira. Repeat as needed. She has only used it once. \par \par poison ivy right arm- resolved\par -pred course complete\par \par OA knees/knee pain\par -IA depomedrol 80mg b/L was not all that helpful. \par -Orthopedic eval for meniscal tear, partially ruptured cyst. She had HA injections b/L. \par -Posterior knee pain? tendonitis? Baker's cyst? \par -MRI left knee as above.\par -Now going to see Dr. Grewal for possible knee replacement on left.  Advised to hold humira in the two weeks prior to surgery if it will be scheduled.  \par \par Right PAB- not active\par -80mg depomedrol injection given previously\par \par Back pain- improved with med\par -has not used the Rx tizanidine 2mg QHS for back pain. To use if needed in the future. \par \par f/u 3 months, labs before next visit. \par \par

## 2022-05-04 ENCOUNTER — APPOINTMENT (OUTPATIENT)
Dept: ORTHOPEDIC SURGERY | Facility: CLINIC | Age: 67
End: 2022-05-04

## 2022-05-04 ENCOUNTER — APPOINTMENT (OUTPATIENT)
Dept: ORTHOPEDIC SURGERY | Facility: CLINIC | Age: 67
End: 2022-05-04
Payer: OTHER MISCELLANEOUS

## 2022-05-04 VITALS
DIASTOLIC BLOOD PRESSURE: 80 MMHG | BODY MASS INDEX: 36.22 KG/M2 | WEIGHT: 239 LBS | SYSTOLIC BLOOD PRESSURE: 130 MMHG | HEIGHT: 68 IN

## 2022-05-04 PROCEDURE — 99204 OFFICE O/P NEW MOD 45 MIN: CPT

## 2022-05-04 PROCEDURE — 99072 ADDL SUPL MATRL&STAF TM PHE: CPT

## 2022-05-04 PROCEDURE — 73562 X-RAY EXAM OF KNEE 3: CPT | Mod: 50

## 2022-05-04 NOTE — HISTORY OF PRESENT ILLNESS
[Has the patient missed work because of the injury/illness?] : The patient has missed work because of the injury/illness. [No] : The patient is currently not working. [de-identified] : 67 y/o female b/l knee pain. She has more pain in the left knee than in the right knee. She had HA injections 1 year ago which did not provide. She has also received cortisone injections which didn't get her relief either. She was given a Medrol dose soraya by pain management but she hasn't started the medication yet. She was told to check the knees with an orthopedic surgeon first. She has a hx of RA on methotrexate and Humira injections every 2 weeks. Pt had an MRI of right knee in 11/13/20 showing medial meniscus tear and moderate patellofemoral osteoarthritis. She is under pain management with Dr. Curtis who has given her epidural injections.  [FreeTextEntry1] : She fell on 8/5/18 and landed on both knees. She sustained a right ACL tendon rupture which was treated nonoperatively by an orthopedic surgeon. She describes her pain as sharp achy throbbing, stabbing and shooting. She has the most pain with stairs. Her last day of work was in February 2019--she is no longer working and is on disability by the orthopedist who is following her ankles.  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

## 2022-05-04 NOTE — HISTORY OF PRESENT ILLNESS
[Has the patient missed work because of the injury/illness?] : The patient has missed work because of the injury/illness. [No] : The patient is currently not working. [de-identified] : 65 y/o female b/l knee pain. She has more pain in the left knee than in the right knee. She had HA injections 1 year ago which did not provide. She has also received cortisone injections which didn't get her relief either. She was given a Medrol dose soraya by pain management but she hasn't started the medication yet. She was told to check the knees with an orthopedic surgeon first. She has a hx of RA on methotrexate and Humira injections every 2 weeks. Pt had an MRI of right knee in 11/13/20 showing medial meniscus tear and moderate patellofemoral osteoarthritis. She is under pain management with Dr. Curtis who has given her epidural injections.  [FreeTextEntry1] : She fell on 8/5/18 and landed on both knees. She sustained a right ACL tendon rupture which was treated nonoperatively by an orthopedic surgeon. She describes her pain as sharp achy throbbing, stabbing and shooting. She has the most pain with stairs. Her last day of work was in February 2019--she is no longer working and is on disability by the orthopedist who is following her ankles.  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

## 2022-05-04 NOTE — REASON FOR VISIT
[Workers' Comp: Date of Injury: _______] : This visit is related to worker's compensation. Date of Injury: [unfilled]

## 2022-05-04 NOTE — END OF VISIT
[FreeTextEntry3] : I, Conor Rosales, acted solely as a scribe for Dr. Jose Alfredo Grewal on this date 05/04/2022.

## 2022-05-04 NOTE — DISCUSSION/SUMMARY
[de-identified] : 65 y/o F with advanced medial and patellofemoral osteoarthritis of both knees. We discussed the nature of the condition and the treatment options. Based on imaging, is a candidate for a staged bilateral TKA once her pain is severe and she fails conservative therapies. She has received cortisone injections and HA injections in the past which did not provide a lot of relief. Her pain is tolerable at this time and defers injections and surgery. F/u PRN. \par \par The patient is a 66 year individual with end stage arthritis of their b/l knee joint. Based upon the patient's continued symptoms and failure to respond to conservative treatment I have recommended a staged b/l total knee arthroplasty for this patient. A long discussion took place with the patient describing what a total joint replacement is and what the procedure would entail. A total knee arthroplasty model, similar to the implant that was used during the operation, was utilized to demonstrate and to discuss the various bearing surfaces of the implants. The hospitalization and post-operative care and rehabilitation were also discussed. The use of perioperative antibiotics and DVT prophylaxis were discussed. The risk, benefits and alternatives to a surgical intervention were discussed at length with the patient. The patient was also advised of risks related to the medical comorbidities, elevated body mass index (BMI), and smoking where applicable. We discussed how to reduce modifiable risk factors and encouraged smoking cessation were applicable.. A lengthy discussion took place to review the most common complications including but not limited to: deep vein thrombosis, pulmonary embolus, heart attack, stroke, infection, wound breakdown, numbness, damage to nerves, tendon, muscles, arteries or other blood vessels, death and other possible complications from anesthesia. The patient was told that we will take steps to minimize these risks by using sterile technique, antibiotics and DVT prophylaxis when appropriate and follow the patient postoperatively in the office setting to monitor progress. The possibility of recurrent pain, no improvement in pain and actual worsening of pain were also discussed with the patient.\par The discharge plan of care focused on the patient going home following surgery. The patient was encouraged to make the necessary arrangements to have someone stay with them when they are discharged home. Following discharge, a home care nurse was to the patient. The home care nurse would open the patient’s home care case and request home physical therapy services. Home physical therapy was to commence following discharge provided it was appropriate and covered by the health insurance benefit plan. \par The benefits of surgery were discussed with the patient including the potential for improving her current clinical condition through operative intervention. Alternatives to surgical intervention including continued conservative management were also discussed in detail. All questions were answered to the satisfaction of the patient. The treatment plan of care, as well as a model of a total knee arthroplasty equivalent to the one that will be used for their total joint replacement, was shared with the patient. The patient agreed to the plan of care as well as the use of implants in their total joint replacement.

## 2022-05-04 NOTE — PHYSICAL EXAM
[Antalgic] : antalgic [LE] : Sensory: Intact in bilateral lower extremities [ALL] : dorsalis pedis, posterior tibial, femoral, popliteal, and radial 2+ and symmetric bilaterally [Normal] : Alert and in no acute distress [Poor Appearance] : well-appearing [de-identified] : GENERAL APPEARANCE: Well nourished and hydrated, pleasant, alert, and oriented x 3. Appears their stated age. \par HEENT: Normocephalic, extraocular eye motion intact. Nasal septum midline. Oral cavity clear. External auditory canal clear. \par RESPIRATORY: Breath sounds clear and audible in all lobes. No wheezing, No accessory muscle use.\par CARDIOVASCULAR: No apparent abnormalities. No lower leg edema. No varicosities. Pedal pulses are palpable.\par NEUROLOGIC: Sensation is normal, no muscle weakness in the upper or lower extremities.\par DERMATOLOGIC: No apparent skin lesions, moist, warm, no rash.\par SPINE: Cervical spine appears normal and moves freely; thoracic spine appears normal and moves freely; lumbosacral spine appears normal and moves freely, normal, nontender.\par MUSCULOSKELETAL: Hands, wrists, and elbows are normal and move freely, shoulders are normal and move freely.  [de-identified] : b/l knee exam shows varus alignment, ROM is 0-110 degree with pain\par + Paz in the left ankle  [de-identified] : 5V xray of the b/l knee done in the office today and reviewed by Dr. Jose Alfredo Grewal demonstrates advanced medial and patellofemoral osteoarthritis of both knees\par \par MRI of the left knee performed at [facility] on 5/24/2021 showed the following:\par 1. Complex tear of the posterior horn of the medial meniscus as outlined above. Adjacent para meniscal cyst extending into the posterior medial aspect of the medial gutter. Superimposed blunting along the free edge of the body segment of the medial meniscus which may be related to postsurgical change or a superimposed free edge radial tearing. Moderate medial tibiofemoral compartment arthrosis.\par 2. Severe patellofemoral compartment arthrosis.\par 3. Chronic appearing small focal linear defect within the lateral insertional fibers of the quadriceps tendon concerning for a chronic partial thickness tear. This is seen in the setting of mild tendinosis.\par 4. Ganglion cyst formation at the semimembranous insertion with partial intraosseous extension.\par 5. Small joint effusion with synovitis. Small Baker's cyst containing a punctate loose hypointense bodies.\par \par MRI of the right knee performed at [facility] on 11/13/2020 showed the following:\par 1. Large horizontal tear of the medial meniscus with associated large 3.5 cm multilobulated para meniscal cyst tracking along the medial joint line with partial rupture.\par 2. Moderate chondromalacia of the patellofemoral compartment.\par 3. Small 1 cm ganglion extending off the anterior aspect of the proximal tib-fib joint.\par 4. Small joint effusion.\par 5. Varicose veins noted within the lateral subcutaneous soft tissues.

## 2022-05-04 NOTE — DISCUSSION/SUMMARY
[de-identified] : 67 y/o F with advanced medial and patellofemoral osteoarthritis of both knees. We discussed the nature of the condition and the treatment options. Based on imaging, is a candidate for a staged bilateral TKA once her pain is severe and she fails conservative therapies. She has received cortisone injections and HA injections in the past which did not provide a lot of relief. Her pain is tolerable at this time and defers injections and surgery. F/u PRN. \par \par The patient is a 66 year individual with end stage arthritis of their b/l knee joint. Based upon the patient's continued symptoms and failure to respond to conservative treatment I have recommended a staged b/l total knee arthroplasty for this patient. A long discussion took place with the patient describing what a total joint replacement is and what the procedure would entail. A total knee arthroplasty model, similar to the implant that was used during the operation, was utilized to demonstrate and to discuss the various bearing surfaces of the implants. The hospitalization and post-operative care and rehabilitation were also discussed. The use of perioperative antibiotics and DVT prophylaxis were discussed. The risk, benefits and alternatives to a surgical intervention were discussed at length with the patient. The patient was also advised of risks related to the medical comorbidities, elevated body mass index (BMI), and smoking where applicable. We discussed how to reduce modifiable risk factors and encouraged smoking cessation were applicable.. A lengthy discussion took place to review the most common complications including but not limited to: deep vein thrombosis, pulmonary embolus, heart attack, stroke, infection, wound breakdown, numbness, damage to nerves, tendon, muscles, arteries or other blood vessels, death and other possible complications from anesthesia. The patient was told that we will take steps to minimize these risks by using sterile technique, antibiotics and DVT prophylaxis when appropriate and follow the patient postoperatively in the office setting to monitor progress. The possibility of recurrent pain, no improvement in pain and actual worsening of pain were also discussed with the patient.\par The discharge plan of care focused on the patient going home following surgery. The patient was encouraged to make the necessary arrangements to have someone stay with them when they are discharged home. Following discharge, a home care nurse was to the patient. The home care nurse would open the patient’s home care case and request home physical therapy services. Home physical therapy was to commence following discharge provided it was appropriate and covered by the health insurance benefit plan. \par The benefits of surgery were discussed with the patient including the potential for improving her current clinical condition through operative intervention. Alternatives to surgical intervention including continued conservative management were also discussed in detail. All questions were answered to the satisfaction of the patient. The treatment plan of care, as well as a model of a total knee arthroplasty equivalent to the one that will be used for their total joint replacement, was shared with the patient. The patient agreed to the plan of care as well as the use of implants in their total joint replacement.

## 2022-05-04 NOTE — PHYSICAL EXAM
[Antalgic] : antalgic [LE] : Sensory: Intact in bilateral lower extremities [ALL] : dorsalis pedis, posterior tibial, femoral, popliteal, and radial 2+ and symmetric bilaterally [Normal] : Alert and in no acute distress [Poor Appearance] : well-appearing [de-identified] : GENERAL APPEARANCE: Well nourished and hydrated, pleasant, alert, and oriented x 3. Appears their stated age. \par HEENT: Normocephalic, extraocular eye motion intact. Nasal septum midline. Oral cavity clear. External auditory canal clear. \par RESPIRATORY: Breath sounds clear and audible in all lobes. No wheezing, No accessory muscle use.\par CARDIOVASCULAR: No apparent abnormalities. No lower leg edema. No varicosities. Pedal pulses are palpable.\par NEUROLOGIC: Sensation is normal, no muscle weakness in the upper or lower extremities.\par DERMATOLOGIC: No apparent skin lesions, moist, warm, no rash.\par SPINE: Cervical spine appears normal and moves freely; thoracic spine appears normal and moves freely; lumbosacral spine appears normal and moves freely, normal, nontender.\par MUSCULOSKELETAL: Hands, wrists, and elbows are normal and move freely, shoulders are normal and move freely.  [de-identified] : b/l knee exam shows varus alignment, ROM is 0-110 degree with pain\par + Paz in the left ankle  [de-identified] : 5V xray of the b/l knee done in the office today and reviewed by Dr. Jose Alfredo Grewal demonstrates advanced medial and patellofemoral osteoarthritis of both knees\par \par MRI of the left knee performed at [facility] on 5/24/2021 showed the following:\par 1. Complex tear of the posterior horn of the medial meniscus as outlined above. Adjacent para meniscal cyst extending into the posterior medial aspect of the medial gutter. Superimposed blunting along the free edge of the body segment of the medial meniscus which may be related to postsurgical change or a superimposed free edge radial tearing. Moderate medial tibiofemoral compartment arthrosis.\par 2. Severe patellofemoral compartment arthrosis.\par 3. Chronic appearing small focal linear defect within the lateral insertional fibers of the quadriceps tendon concerning for a chronic partial thickness tear. This is seen in the setting of mild tendinosis.\par 4. Ganglion cyst formation at the semimembranous insertion with partial intraosseous extension.\par 5. Small joint effusion with synovitis. Small Baker's cyst containing a punctate loose hypointense bodies.\par \par MRI of the right knee performed at [facility] on 11/13/2020 showed the following:\par 1. Large horizontal tear of the medial meniscus with associated large 3.5 cm multilobulated para meniscal cyst tracking along the medial joint line with partial rupture.\par 2. Moderate chondromalacia of the patellofemoral compartment.\par 3. Small 1 cm ganglion extending off the anterior aspect of the proximal tib-fib joint.\par 4. Small joint effusion.\par 5. Varicose veins noted within the lateral subcutaneous soft tissues.

## 2022-06-20 ENCOUNTER — APPOINTMENT (OUTPATIENT)
Dept: VASCULAR SURGERY | Facility: CLINIC | Age: 67
End: 2022-06-20
Payer: MEDICARE

## 2022-06-20 VITALS
HEIGHT: 68 IN | BODY MASS INDEX: 36.22 KG/M2 | TEMPERATURE: 97.8 F | OXYGEN SATURATION: 98 % | SYSTOLIC BLOOD PRESSURE: 125 MMHG | DIASTOLIC BLOOD PRESSURE: 75 MMHG | HEART RATE: 64 BPM | WEIGHT: 239 LBS

## 2022-06-20 PROCEDURE — 36471 NJX SCLRSNT MLT INCMPTNT VN: CPT | Mod: 50

## 2022-06-20 RX ORDER — IMIQUIMOD 50 MG/G
5 CREAM TOPICAL
Qty: 12 | Refills: 0 | Status: COMPLETED | COMMUNITY
Start: 2022-03-10

## 2022-06-20 RX ORDER — DOXYCYCLINE HYCLATE 100 MG/1
100 TABLET ORAL
Qty: 10 | Refills: 0 | Status: COMPLETED | COMMUNITY
Start: 2022-01-07

## 2022-06-20 NOTE — PROCEDURE
[FreeTextEntry1] : BLE sclerotherapy  [FreeTextEntry2] : painful and itchy reticular veins on legs bilaterally  [FreeTextEntry3] : Explained sclerotherapy procedure to patient and obtained verbal consent. All questions answered prior to procedure. 1% polidocanol injected into BLE reticular veins through a 30 gauge needle. Over 20 injections into legs bilaterally. Sterile gauze and ACE wraps applied to legs bilaterally. Pt tolerated procedure well.\par \par \par

## 2022-06-22 ENCOUNTER — APPOINTMENT (OUTPATIENT)
Dept: ORTHOPEDIC SURGERY | Facility: CLINIC | Age: 67
End: 2022-06-22
Payer: OTHER MISCELLANEOUS

## 2022-06-22 VITALS — BODY MASS INDEX: 34.86 KG/M2 | WEIGHT: 230 LBS | HEIGHT: 68 IN

## 2022-06-22 PROCEDURE — 99072 ADDL SUPL MATRL&STAF TM PHE: CPT

## 2022-06-22 PROCEDURE — 99214 OFFICE O/P EST MOD 30 MIN: CPT

## 2022-06-22 NOTE — WORK
[Cannot return to work because ________] : cannot return to work because [unfilled] [No Rx restrictions] : No Rx restrictions. [I provided the services listed above] :  I provided the services listed above. [FreeTextEntry1] : poor

## 2022-06-22 NOTE — HISTORY OF PRESENT ILLNESS
[Lower back] : lower back [Work related] : work related [Sudden] : sudden [6] : 6 [Dull/Aching] : dull/aching [Localized] : localized [Ice] : ice [Heat] : heat [Sitting] : sitting [Standing] : standing [Stairs] : stairs [Not working due to injury] : Work status: not working due to injury [de-identified] : Patient Complaint -  8/6/18/2018 Case [id number]\par 6/3/20: Bilateral thigh pain/burning x 1 week. History of lumbar condition/pain mgt STEVEN's and treatment. Right leg giving\par way episode. Also c/o R knee pain. Requesting injection.\par 6/10/20: f/u R knee Euflexxa #2\par 6/17/20: f/u R knee euflexxa #3, continued right leg weakness. Burning thighs - radiates to legs. Awaiting auth for MRI\par Lumbar spine. Pain knees with ambulation, buckling. Swelling R ankle.\par 7/15/20 f/u back and bilat knees MRI auth pending\par 8/24/2020: f/u margie knees better w/PT MRI LS auth pending\par 9/30/20 f/u R ankle and both knees\par 10/28/20 f/u R heel and mragie knees HEP/PT MRI auth pending Not working\par 12/9/20 f/u R knee pain, bilateral radiating burning post leg pain and R achilles. HE/PT , Not working auth pending\par 1/20/21 Euflexxa #1 margie knees LBP radiating to margie thighs/knees\par 1/27/21 Euflexxa # 2 margie knees lbp radiating to legs. Waiting for auth for mri lumbar spine.\par 2/10/21: Euflexxa #3 margie knees. Just got auth for EMG.\par 3/10/21 f/u radiating lbp to margie thighs, knee pain HEP/PT\par 4/14/21: f/u L > R radiating leg pain to knees. Pain mgt PT Not working\par 5/26/21 f/u lbp radiating L > R postero-lateral thigh and ant med knee. HEP/PT/Pain mgt Not working\par 7/7/21 f/u lbp radiating to post thighs and L > R knee. Pain mgt pending\par 8/25/21 f/u b/l knees - pain with stairs and prolonged standing/ambulation. Aggravates R ankle issue and lb. Continued\par soreness. STEVEN's not auth. Following with pain mgmt. Dr. Cooper. > Left leg radiating pain.\par 9/29/21 f/u margie knees, low back. PT not authorized. Not working\par 12/29/21 f/u margie knees LS. Recent vein surgery Increasing lateral L knee poain. Pain mgt/STEVEN pending\par 2/9/22 f/u margie knees. pain mgmt. STEVEN pending.\par 6/22/22  f/u margie knees, back  Considering tka L > R at end of year [] : no [FreeTextEntry3] : 8-5-18 [FreeTextEntry6] : soreness [de-identified] : Dr. Goodwin [de-identified] : pt has been to pain management

## 2022-06-22 NOTE — PHYSICAL EXAM
[Left] : left knee [Right] : right knee [5___] : quadriceps 5[unfilled]/5 [FreeTextEntry8] : belt like distribution [] : no effusion [TWNoteComboBox7] : flexion 125 degrees

## 2022-07-06 ENCOUNTER — APPOINTMENT (OUTPATIENT)
Dept: VASCULAR SURGERY | Facility: CLINIC | Age: 67
End: 2022-07-06

## 2022-07-11 ENCOUNTER — APPOINTMENT (OUTPATIENT)
Dept: VASCULAR SURGERY | Facility: CLINIC | Age: 67
End: 2022-07-11

## 2022-07-11 VITALS
DIASTOLIC BLOOD PRESSURE: 77 MMHG | OXYGEN SATURATION: 96 % | HEIGHT: 68 IN | TEMPERATURE: 97.5 F | WEIGHT: 230 LBS | BODY MASS INDEX: 34.86 KG/M2 | SYSTOLIC BLOOD PRESSURE: 129 MMHG | HEART RATE: 66 BPM

## 2022-07-11 DIAGNOSIS — I87.2 VENOUS INSUFFICIENCY (CHRONIC) (PERIPHERAL): ICD-10-CM

## 2022-07-11 PROCEDURE — 99213 OFFICE O/P EST LOW 20 MIN: CPT

## 2022-07-11 PROCEDURE — 93971 EXTREMITY STUDY: CPT

## 2022-07-11 NOTE — PROCEDURE
[FreeTextEntry1] : 7/29/21 RLE venous duplex: GSV closed. No DVT \par \par 4/25/22 BLE venous duplex: \par right: GSV not visualized. No DVT \par left: GSV not visualized. AASV reflux SFJ to mid thigh. SSV tribs noted in proximal calf. No DVT \par \par 7/11/22 left lower extremity venous duplex:  GSV not visualized. AASV reflux SFJ to mid thigh. No DVT

## 2022-07-11 NOTE — ASSESSMENT
[FreeTextEntry1] : 67 y/o female with painful and itchy reticular veins on left leg. Failed conservative management with compression stockings. She is also s/p LLE GSV RFA 7/27/21 and RLE GSV RFA 12/20/21. pt has left lateral thigh pain likely due to spinal stenosis. \par \par Pt counseled on results of duplex and above diagnosis.\par Continue to utilize conservative measures: prescription stockings from awakening until that time, frequent ambulation, leg elevation at rest and lymphedema pumps.\par Plan for BLE sclerotherapy. The risks and benefits of the surgical procedure were discussed with patient, all questions were answered.\par \par A total of 20 minutes was spent with patient and coordinating care\par

## 2022-07-11 NOTE — PHYSICAL EXAM
[2+] : left 2+ [Varicose Veins Of Lower Extremities] : bilaterally [Ankle Swelling Bilaterally] : severe [] : present [Ankle Swelling On The Right] : mild [Alert] : alert [Oriented to Person] : oriented to person [Oriented to Place] : oriented to place [Oriented to Time] : oriented to time [Calm] : calm [de-identified] : WD, WN, NAD. Awake, alert, interactive. Ambulates without difficulty [de-identified] : non-labored [FreeTextEntry1] : Numerous reticular veins in left thigh and ankle \par No edema, erythema, drainage, pain or tenderness. \par BLE mild hyperpigmentation and skin changes. [de-identified] : no cyanosis or deformity. full ROM, MS 5/5. Tenderness to left piriformis region causing 5/10 pain.\par  [de-identified] : NAZARIO

## 2022-07-11 NOTE — HISTORY OF PRESENT ILLNESS
[FreeTextEntry1] : 9/20/21: 67 y/o female s/p LLE GSV RFA 7/27/21 and RLE GSV RFA 12/20/21.  She was also approved for a left ASV RFA and right Giacomini stab phlebectomy.  She is feeling well, no complaints referred.  There is no bruising, swelling or pain.  She has been walking and elevating her legs at rest.  She would like to schedule left lower extremity GSV RFA and stab phlebectomies of the right Giacomini  vein.  She states she is also bothered by the bulging veins to her anterior left thigh and scattered through b/l legs.  She states they will bulge later in the day and cause pain.  No current pain reported.  No fever or chills.  She is a non-smoker.\par \par 1/10/22: Pt doing well since last visit. She has less leg swelling and pain since her procedures. She denies any severe leg pain, fever or chills. She has been compliant with compression stockings. \par \par 4/25/22: Pt has been having left posterior calf to mid thigh pain. Pt was told by the orthopedist to see pain management because he feels the pain may be due to spinal stenosis. Pt feels her legs bilaterally feel achy and sore and they get fatigued very easily. Pt has arthritis in her knees and was told she needs a left knee replacement. Pt has been taking Mobic 15 mg  and ibuprofen for the pain without improvement in the pain. Pt does not take gabapentin because she did not get any improvement in her pain. Pt has itchy reticular and spider veins in her left thigh and ankle. She has been compliant with 20-30 mmHg compression stockings for over 3 months without significant improvement in pain. \par \par 7/11/22: Pt has been having left lateral thigh pain for the past 2-3 weeks. Pt has been having left foot pain for the past 3 days. She has been diagnosed with spinal stenosis. She also feels her left leg is more swollen than right. She has several itchy spider and reticular veins that remain despite sclerotherapy. She has been compliant with 20-30 mmHg compression stockings for over 3 months without significant improvement in pain. \par \par PSHx:\par 6/20/22 BLE sclerotherapy \par 12/16/21 LLE phlebectomy \par 7/27/21 LLE GSV RFA \par 12/20/21 RLE GSV RFA

## 2022-07-26 ENCOUNTER — APPOINTMENT (OUTPATIENT)
Dept: RHEUMATOLOGY | Facility: CLINIC | Age: 67
End: 2022-07-26

## 2022-07-26 VITALS
BODY MASS INDEX: 36.04 KG/M2 | SYSTOLIC BLOOD PRESSURE: 128 MMHG | OXYGEN SATURATION: 99 % | TEMPERATURE: 97.9 F | WEIGHT: 237 LBS | DIASTOLIC BLOOD PRESSURE: 72 MMHG | HEART RATE: 69 BPM

## 2022-07-26 PROCEDURE — 99214 OFFICE O/P EST MOD 30 MIN: CPT

## 2022-07-26 RX ORDER — MELOXICAM 15 MG/1
15 TABLET ORAL
Qty: 90 | Refills: 0 | Status: DISCONTINUED | COMMUNITY
Start: 2022-03-29 | End: 2022-07-26

## 2022-07-26 RX ORDER — BACLOFEN 10 MG/1
10 TABLET ORAL 3 TIMES DAILY
Qty: 30 | Refills: 1 | Status: DISCONTINUED | COMMUNITY
Start: 2022-04-25 | End: 2022-07-26

## 2022-07-26 RX ORDER — BACLOFEN 10 MG/1
10 TABLET ORAL EVERY 6 HOURS
Qty: 30 | Refills: 0 | Status: DISCONTINUED | COMMUNITY
Start: 2022-07-11 | End: 2022-07-26

## 2022-07-26 NOTE — HISTORY OF PRESENT ILLNESS
[FreeTextEntry1] : This is a 65 y/o woman with hx of venous insufficiency, OA, infiltrative basal cell carcinoma 11/2020, right ruptured achilles tendon, and chronic back pain here for f/u of seronegative RA.\par \par She had covid 12/27/2021. \par She had her booster already.\par Had symptoms of headache, rash, and GI symptoms. \par \par She started MTX 8/2020 and has felt improvement. Completed a course of prednisone. \par Denies any issues with MTX, no oral sores, rashes, hair loss, abdominal pain, cough, sob. \par She was switched to MTX subQ now on 20mg subQ.\par \par She started Humira, tolerating it well, however thinks it may not be doing enough for her hands, toes, and low back. \par \par Nevus left upper chest\par \par Left knee continues to be her worse area. Pain level is 8/10 in intensity\par She is on naproxen 500mg and baclofen. \par Mobic didn't help. \par She knows she needs a left knee replacement. Planning to see Dr. Grewal. If her left knee was taken care of, she reports her pain level overall would likely be a 3-4/10. \par No significant issue in hands. \par \par She had vein surgery 12/16/2021, but that is finished and she started the humira. \par MRI shows b/L sacroiliitis. \par \par MRI left knee shows complex tear meniscus, tendinosis, ganglion cyst, small joint effusion with synovitis, baker's cyst. \par She finished gel shots for knees. \par Her orthopedic thinks it's coming from her back, but now she is not so sure. \par \par MRI right knee shows 11/13/20 meniscal tear, partial rupture of para meniscal cyst. Small joint effusion, chondromalacia. \par \par She saw a cardiologist to check on her SOB and fatigue. It is now resolved. She did have 2D ECHO.\par \par

## 2022-08-03 ENCOUNTER — APPOINTMENT (OUTPATIENT)
Dept: ORTHOPEDIC SURGERY | Facility: CLINIC | Age: 67
End: 2022-08-03

## 2022-08-03 VITALS — BODY MASS INDEX: 35.92 KG/M2 | HEIGHT: 68 IN | WEIGHT: 237 LBS

## 2022-08-03 PROCEDURE — 99072 ADDL SUPL MATRL&STAF TM PHE: CPT

## 2022-08-03 PROCEDURE — 99214 OFFICE O/P EST MOD 30 MIN: CPT

## 2022-08-03 NOTE — PHYSICAL EXAM
[Left] : left knee [Right] : right knee [5___] : quadriceps 5[unfilled]/5 [FreeTextEntry8] : belt like distribution lower back [] : no effusion [TWNoteComboBox7] : flexion 125 degrees

## 2022-08-03 NOTE — REVIEW OF SYSTEMS
[Muscle Weakness] : muscle weakness [Negative] : Heme/Lymph [Joint Pain] : joint pain [Joint Stiffness] : joint stiffness [Joint Swelling] : joint swelling [FreeTextEntry9] : legs get tired

## 2022-08-09 ENCOUNTER — APPOINTMENT (OUTPATIENT)
Dept: VASCULAR SURGERY | Facility: CLINIC | Age: 67
End: 2022-08-09

## 2022-08-09 VITALS
HEIGHT: 68 IN | DIASTOLIC BLOOD PRESSURE: 72 MMHG | OXYGEN SATURATION: 96 % | BODY MASS INDEX: 35.46 KG/M2 | WEIGHT: 234 LBS | RESPIRATION RATE: 14 BRPM | HEART RATE: 69 BPM | TEMPERATURE: 97.3 F | SYSTOLIC BLOOD PRESSURE: 132 MMHG

## 2022-08-09 PROCEDURE — 36471 NJX SCLRSNT MLT INCMPTNT VN: CPT | Mod: LT

## 2022-08-09 RX ORDER — FLUOCINOLONE ACETONIDE, HYDROQUINONE, AND TRETINOIN .1; 40; .5 MG/G; MG/G; MG/G
0.01-4-0.05 CREAM TOPICAL
Qty: 1 | Refills: 5 | Status: ACTIVE | COMMUNITY
Start: 2022-08-09 | End: 1900-01-01

## 2022-08-09 NOTE — PROCEDURE
[FreeTextEntry1] : LLE sclerotherapy  [FreeTextEntry2] : spider and reticular veins of left leg  [FreeTextEntry3] : Explained sclerotherapy procedure to patient and obtained verbal consent. All questions answered prior to procedure. 70% alcohol used to prep left leg prior to injection. 1% polidocanol injected into LLE reticular veins through a 30 gauge needle. Over 20 injections into leg. Sterile gauze and ACE wraps applied to legs bilaterally. Pt tolerated procedure well.\par \par \par

## 2022-08-19 ENCOUNTER — APPOINTMENT (OUTPATIENT)
Dept: ORTHOPEDIC SURGERY | Facility: CLINIC | Age: 67
End: 2022-08-19

## 2022-09-06 ENCOUNTER — APPOINTMENT (OUTPATIENT)
Dept: RHEUMATOLOGY | Facility: CLINIC | Age: 67
End: 2022-09-06

## 2022-09-12 ENCOUNTER — APPOINTMENT (OUTPATIENT)
Dept: VASCULAR SURGERY | Facility: CLINIC | Age: 67
End: 2022-09-12

## 2022-09-12 VITALS
OXYGEN SATURATION: 97 % | HEART RATE: 68 BPM | HEIGHT: 68 IN | TEMPERATURE: 97.3 F | SYSTOLIC BLOOD PRESSURE: 139 MMHG | BODY MASS INDEX: 35.31 KG/M2 | WEIGHT: 233 LBS | DIASTOLIC BLOOD PRESSURE: 72 MMHG | RESPIRATION RATE: 14 BRPM

## 2022-09-12 PROCEDURE — 99213 OFFICE O/P EST LOW 20 MIN: CPT

## 2022-09-12 NOTE — PHYSICAL EXAM
[2+] : left 2+ [Varicose Veins Of Lower Extremities] : bilaterally [Ankle Swelling Bilaterally] : severe [] : present [Ankle Swelling On The Right] : mild [Alert] : alert [Oriented to Person] : oriented to person [Oriented to Place] : oriented to place [Oriented to Time] : oriented to time [Calm] : calm [de-identified] : WD, WN, NAD. Awake, alert, interactive. Ambulates without difficulty [de-identified] : non-labored [FreeTextEntry1] : Numerous reticular veins in left thigh and ankle \par No edema, erythema, drainage, pain or tenderness. \par BLE mild hyperpigmentation and skin changes. [de-identified] : no cyanosis or deformity. full ROM, MS 5/5. Tenderness to left piriformis region causing 5/10 pain.\par  [de-identified] : NAZARIO

## 2022-09-12 NOTE — ASSESSMENT
[FreeTextEntry1] : 68 y/o female with painful and itchy reticular veins on left leg. Failed conservative management with compression stockings. She is also s/p LLE GSV RFA 7/27/21 and RLE GSV RFA 12/20/21. \par \par \par Continue to utilize conservative measures: prescription stockings from awakening until that time, frequent ambulation, leg elevation at rest and lymphedema pumps.\par Plan for BLE sclerotherapy. The risks and benefits of the surgical procedure were discussed with patient, all questions were answered.\par \par A total of 20 minutes was spent with patient and coordinating care\par

## 2022-09-12 NOTE — HISTORY OF PRESENT ILLNESS
[FreeTextEntry1] : 9/20/21: 67 y/o female s/p LLE GSV RFA 7/27/21 and RLE GSV RFA 12/20/21.  She was also approved for a left ASV RFA and right Giacomini stab phlebectomy.  She is feeling well, no complaints referred.  There is no bruising, swelling or pain.  She has been walking and elevating her legs at rest.  She would like to schedule left lower extremity GSV RFA and stab phlebectomies of the right Giacomini  vein.  She states she is also bothered by the bulging veins to her anterior left thigh and scattered through b/l legs.  She states they will bulge later in the day and cause pain.  No current pain reported.  No fever or chills.  She is a non-smoker.\par \par 1/10/22: Pt doing well since last visit. She has less leg swelling and pain since her procedures. She denies any severe leg pain, fever or chills. She has been compliant with compression stockings. \par \par 4/25/22: Pt has been having left posterior calf to mid thigh pain. Pt was told by the orthopedist to see pain management because he feels the pain may be due to spinal stenosis. Pt feels her legs bilaterally feel achy and sore and they get fatigued very easily. Pt has arthritis in her knees and was told she needs a left knee replacement. Pt has been taking Mobic 15 mg  and ibuprofen for the pain without improvement in the pain. Pt does not take gabapentin because she did not get any improvement in her pain. Pt has itchy reticular and spider veins in her left thigh and ankle. She has been compliant with 20-30 mmHg compression stockings for over 3 months without significant improvement in pain. \par \par 7/11/22: Pt has been having left lateral thigh pain for the past 2-3 weeks. Pt has been having left foot pain for the past 3 days. She has been diagnosed with spinal stenosis. She also feels her left leg is more swollen than right. She has several itchy spider and reticular veins that remain despite sclerotherapy. She has been compliant with 20-30 mmHg compression stockings for over 3 months without significant improvement in pain. \par \par 9/12/22: Pt is doing well since BLE sclerotherapy. She has a vein in her left medial ankle that scabbed over after injection. She still has several itchy spider and reticular veins that remain despite sclerotherapy. She has been compliant with 20-30 mmHg compression stockings for over 3 months without significant improvement in pain. \par \par PSHx:\par 8/9/22 BLE sclerotherapy \par 6/20/22 BLE sclerotherapy \par 12/16/21 LLE phlebectomy \par 7/27/21 LLE GSV RFA \par 12/20/21 RLE GSV RFA

## 2022-09-21 ENCOUNTER — APPOINTMENT (OUTPATIENT)
Dept: ORTHOPEDIC SURGERY | Facility: CLINIC | Age: 67
End: 2022-09-21

## 2022-09-22 ENCOUNTER — RX RENEWAL (OUTPATIENT)
Age: 67
End: 2022-09-22

## 2022-09-28 ENCOUNTER — APPOINTMENT (OUTPATIENT)
Dept: ORTHOPEDIC SURGERY | Facility: CLINIC | Age: 67
End: 2022-09-28

## 2022-09-28 VITALS — HEIGHT: 68 IN | WEIGHT: 233 LBS | BODY MASS INDEX: 35.31 KG/M2

## 2022-09-28 PROCEDURE — 99072 ADDL SUPL MATRL&STAF TM PHE: CPT

## 2022-09-28 PROCEDURE — 99213 OFFICE O/P EST LOW 20 MIN: CPT

## 2022-09-28 NOTE — REVIEW OF SYSTEMS
[Joint Pain] : joint pain [Joint Stiffness] : joint stiffness [Joint Swelling] : joint swelling [Muscle Weakness] : muscle weakness [Negative] : Heme/Lymph [FreeTextEntry9] : legs get tired

## 2022-09-28 NOTE — HISTORY OF PRESENT ILLNESS
[Lower back] : lower back [Work related] : work related [Sudden] : sudden [Burning] : burning [Localized] : localized [Sitting] : sitting [Standing] : standing [Stairs] : stairs [Not working due to injury] : Work status: not working due to injury [5] : 5 [Dull/Aching] : dull/aching [Shooting] : shooting [Stabbing] : stabbing [Physical therapy] : physical therapy [de-identified] : Patient Complaint -  8/6/18/2018 Case [id number]\par 6/3/20: Bilateral thigh pain/burning x 1 week. History of lumbar condition/pain mgt STEVEN's and treatment. Right leg giving\par way episode. Also c/o R knee pain. Requesting injection.\par 6/10/20: f/u R knee Euflexxa #2\par 6/17/20: f/u R knee euflexxa #3, continued right leg weakness. Burning thighs - radiates to legs. Awaiting auth for MRI\par Lumbar spine. Pain knees with ambulation, buckling. Swelling R ankle.\par 7/15/20 f/u back and bilat knees MRI auth pending\par 8/24/2020: f/u margie knees better w/PT MRI LS auth pending\par 9/30/20 f/u R ankle and both knees\par 10/28/20 f/u R heel and margie knees HEP/PT MRI auth pending Not working\par 12/9/20 f/u R knee pain, bilateral radiating burning post leg pain and R achilles. HE/PT , Not working auth pending\par 1/20/21 Euflexxa #1 margie knees LBP radiating to margie thighs/knees\par 1/27/21 Euflexxa # 2 margie knees lbp radiating to legs. Waiting for auth for mri lumbar spine.\par 2/10/21: Euflexxa #3 margie knees. Just got auth for EMG.\par 3/10/21 f/u radiating lbp to margie thighs, knee pain HEP/PT\par 4/14/21: f/u L > R radiating leg pain to knees. Pain mgt PT Not working\par 5/26/21 f/u lbp radiating L > R postero-lateral thigh and ant med knee. HEP/PT/Pain mgt Not working\par 7/7/21 f/u lbp radiating to post thighs and L > R knee. Pain mgt pending\par 8/25/21 f/u b/l knees - pain with stairs and prolonged standing/ambulation. Aggravates R ankle issue and lb. Continued\par soreness. STEVEN's not auth. Following with pain mgmt. Dr. Cooper. > Left leg radiating pain.\par 9/29/21 f/u margie knees, low back. PT not authorized. Not working\par 12/29/21 f/u margie knees LS. Recent vein surgery Increasing lateral L knee poain. Pain mgt/STEVEN pending\par 2/9/22 f/u margie knees. pain mgmt. STEVEN pending.\par 6/22/22  f/u margie knees, back  Considering tka L > R at end of year\par 8/3/22  f/u margie knees lumbar spine  PT auth pending  Not working  Takes Naprosyn/Rheum\par 9/28/22  f/u lbp, margie knees.  Restarted PT  Pain Mgt  Takes MTx and Naprosyn prnNot working [] : no [FreeTextEntry1] : knees and ankle [FreeTextEntry3] : 8-5-18 [FreeTextEntry5] : rt knee worse than the left  [FreeTextEntry6] : soreness [FreeTextEntry9] : naproxen [de-identified] : pt has been to pain management

## 2022-10-11 ENCOUNTER — APPOINTMENT (OUTPATIENT)
Dept: RHEUMATOLOGY | Facility: CLINIC | Age: 67
End: 2022-10-11

## 2022-10-11 VITALS
SYSTOLIC BLOOD PRESSURE: 140 MMHG | WEIGHT: 240 LBS | DIASTOLIC BLOOD PRESSURE: 70 MMHG | HEART RATE: 75 BPM | OXYGEN SATURATION: 99 % | BODY MASS INDEX: 36.37 KG/M2 | HEIGHT: 68 IN | TEMPERATURE: 97.5 F

## 2022-10-11 DIAGNOSIS — L23.7 ALLERGIC CONTACT DERMATITIS DUE TO PLANTS, EXCEPT FOOD: ICD-10-CM

## 2022-10-11 PROCEDURE — 99214 OFFICE O/P EST MOD 30 MIN: CPT

## 2022-10-11 RX ORDER — ADALIMUMAB 40MG/0.4ML
40 KIT SUBCUTANEOUS
Qty: 2 | Refills: 2 | Status: DISCONTINUED | COMMUNITY
Start: 2021-11-04 | End: 2022-10-11

## 2022-10-11 RX ORDER — CHLORTHALIDONE 25 MG/1
25 TABLET ORAL
Refills: 0 | Status: DISCONTINUED | COMMUNITY
End: 2022-10-11

## 2022-10-11 NOTE — ASSESSMENT
[FreeTextEntry1] : -Will hold off on humira for now. Discussed cosentyx, however she reports she is having insurance changes, and would prefer to wait until that is done to start cosentyx.  \par - MRI pelvis shows sacroiliitis. XR hands, unremarkable. \par -labs reviewed, neg RF/CCP, Elevated ESR/CRP\par -Requesting refill for naproxen. Rx naproxen 500mg BID PRN.  She uses it about 3x/week.  \par -cont subQ MTX 20mg q week for now. \par -Patient is aware to hold MTX if develops infection. Patient is aware to avoid alcohol while on this medication. \par -Rx folc acid 1mg daily\par -Previously responsive to oral prednisone. \par \par Derm\par -sees Dr. Valero\par \par OA knees/knee pain\par -IA depomedrol 80mg b/L was not all that helpful. \par -MRI left knee as above.\par -Now going to see Dr. Grewal for possible knee replacement on left. Advised to hold humira in the two weeks prior to surgery if it will be scheduled. \par \par Right PAB- not active\par -80mg depomedrol injection given previously\par \par Back pain-initially improved with med\par -has not used the Rx tizanidine 2mg QHS for back pain. To use if needed in the future. \par \par rpa 3-4 months, sooner if needed.  Labs before next visit.  \par

## 2022-10-14 ENCOUNTER — APPOINTMENT (OUTPATIENT)
Dept: ORTHOPEDIC SURGERY | Facility: CLINIC | Age: 67
End: 2022-10-14

## 2022-10-14 VITALS
DIASTOLIC BLOOD PRESSURE: 81 MMHG | BODY MASS INDEX: 36.37 KG/M2 | HEIGHT: 68 IN | SYSTOLIC BLOOD PRESSURE: 160 MMHG | WEIGHT: 240 LBS | HEART RATE: 71 BPM

## 2022-10-14 PROCEDURE — 99214 OFFICE O/P EST MOD 30 MIN: CPT

## 2022-10-14 PROCEDURE — 99072 ADDL SUPL MATRL&STAF TM PHE: CPT

## 2022-10-14 NOTE — END OF VISIT
[FreeTextEntry3] : I, Becky Schaffer, acted solely as a scribe for Dr. Jose Alfredo Grewal on 10/14/2022

## 2022-10-14 NOTE — REASON FOR VISIT
[Follow-Up Visit] : a follow-up visit for [Workers' Comp: Date of Injury: _____] : This visit is related to worker's compensation. Date of Injury: [unfilled] [Other: ____] : [unfilled]

## 2022-10-14 NOTE — DISCUSSION/SUMMARY
[Medication Risks Reviewed] : Medication risks reviewed [Surgical risks reviewed] : Surgical risks reviewed [de-identified] : 68 y/o F with advanced medial and patellofemoral osteoarthritis of both knees. We discussed the nature of the condition and the treatment options. Based on imaging, is a candidate for a staged or simultaneous bilateral TKA once her pain is severe and she fails conservative therapies. She received cortisone injections and HA injections in the past which were not helpful. Her quality of life has been decreasing and she feels she is ready for surgery. We discussed conducting a simultaneous bilateral TKA since she does not have major risk factors for infection.. She will talk with the surgical coordinators to schedule simultaneous bilateral surgery. All questions and concerns were answered. \par \par The patient is a 66 year individual with end stage arthritis of their b/l knee joint. Based upon the patient's continued symptoms and failure to respond to conservative treatment I have recommended a staged b/l total knee arthroplasty for this patient. A long discussion took place with the patient describing what a total joint replacement is and what the procedure would entail. A total knee arthroplasty model, similar to the implant that was used during the operation, was utilized to demonstrate and to discuss the various bearing surfaces of the implants. The hospitalization and post-operative care and rehabilitation were also discussed. The use of perioperative antibiotics and DVT prophylaxis were discussed. The risk, benefits and alternatives to a surgical intervention were discussed at length with the patient. The patient was also advised of risks related to the medical comorbidities, elevated body mass index (BMI), and smoking where applicable. We discussed how to reduce modifiable risk factors and encouraged smoking cessation were applicable.. A lengthy discussion took place to review the most common complications including but not limited to: deep vein thrombosis, pulmonary embolus, heart attack, stroke, infection, wound breakdown, numbness, damage to nerves, tendon, muscles, arteries or other blood vessels, death and other possible complications from anesthesia. The patient was told that we will take steps to minimize these risks by using sterile technique, antibiotics and DVT prophylaxis when appropriate and follow the patient postoperatively in the office setting to monitor progress. The possibility of recurrent pain, no improvement in pain and actual worsening of pain were also discussed with the patient.\par The discharge plan of care focused on the patient going home following surgery. The patient was encouraged to make the necessary arrangements to have someone stay with them when they are discharged home. Following discharge, a home care nurse was to the patient. The home care nurse would open the patient’s home care case and request home physical therapy services. Home physical therapy was to commence following discharge provided it was appropriate and covered by the health insurance benefit plan. \par The benefits of surgery were discussed with the patient including the potential for improving her current clinical condition through operative intervention. Alternatives to surgical intervention including continued conservative management were also discussed in detail. All questions were answered to the satisfaction of the patient. The treatment plan of care, as well as a model of a total knee arthroplasty equivalent to the one that will be used for their total joint replacement, was shared with the patient. The patient agreed to the plan of care as well as the use of implants in their total joint replacement.

## 2022-10-14 NOTE — HISTORY OF PRESENT ILLNESS
[Has the patient missed work because of the injury/illness?] : The patient has missed work because of the injury/illness. [No] : The patient is currently not working. [de-identified] : 66 y/o female b/l knee pain. She has more pain in the left knee than in the right knee. She has been in PT right now which has been helping her. However, she says that her pain has been worsening for the past month. She says her quality of life has been decreasing and is interested in surgery.  She had HA injections 1 year ago which did not provide. She has also received cortisone injections which didn't get her relief either. She was given a Medrol dose soraya by pain management but she hasn't started the medication yet. She was told to check the knees with an orthopedic surgeon first. She has a hx of RA on methotrexate and Humira injections every 2 weeks. Pt had an MRI of right knee in 11/13/20 showing medial meniscus tear and moderate patellofemoral osteoarthritis. She is under pain management with Dr. Curtis who has given her epidural injections.  [FreeTextEntry1] : She fell on 8/5/18 and landed on both knees. She sustained a right ACL tendon rupture which was treated nonoperatively by an orthopedic surgeon. She describes her pain as sharp achy throbbing, stabbing and shooting. She has the most pain with stairs. Her last day of work was in February 2019--she is no longer working and is on disability by the orthopedist who is following her ankles.  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

## 2022-10-14 NOTE — PHYSICAL EXAM
[Antalgic] : antalgic [LE] : Sensory: Intact in bilateral lower extremities [ALL] : dorsalis pedis, posterior tibial, femoral, popliteal, and radial 2+ and symmetric bilaterally [Normal] : Alert and in no acute distress [Poor Appearance] : well-appearing [de-identified] : GENERAL APPEARANCE: Well nourished and hydrated, pleasant, alert, and oriented x 3. Appears their stated age. \par HEENT: Normocephalic, extraocular eye motion intact. Nasal septum midline. Oral cavity clear. External auditory canal clear. \par RESPIRATORY: Breath sounds clear and audible in all lobes. No wheezing, No accessory muscle use.\par CARDIOVASCULAR: No apparent abnormalities. No lower leg edema. No varicosities. Pedal pulses are palpable.\par NEUROLOGIC: Sensation is normal, no muscle weakness in the upper or lower extremities.\par DERMATOLOGIC: No apparent skin lesions, moist, warm, no rash.\par SPINE: Cervical spine appears normal and moves freely; thoracic spine appears normal and moves freely; lumbosacral spine appears normal and moves freely, normal, nontender.\par MUSCULOSKELETAL: Hands, wrists, and elbows are normal and move freely, shoulders are normal and move freely.  [de-identified] : b/l knee exam shows varus alignment, ROM is 0-110 degree with pain\par + Paz in the left ankle

## 2022-10-17 ENCOUNTER — APPOINTMENT (OUTPATIENT)
Dept: VASCULAR SURGERY | Facility: CLINIC | Age: 67
End: 2022-10-17

## 2022-10-17 VITALS
HEIGHT: 68 IN | TEMPERATURE: 97.1 F | DIASTOLIC BLOOD PRESSURE: 63 MMHG | RESPIRATION RATE: 16 BRPM | HEART RATE: 77 BPM | SYSTOLIC BLOOD PRESSURE: 101 MMHG | WEIGHT: 233 LBS | OXYGEN SATURATION: 97 % | BODY MASS INDEX: 35.31 KG/M2

## 2022-10-17 DIAGNOSIS — I83.899 VARICOSE VEINS OF UNSPECIFIED LOWER EXTREMITY WITH OTHER COMPLICATIONS: ICD-10-CM

## 2022-10-17 PROCEDURE — 36471 NJX SCLRSNT MLT INCMPTNT VN: CPT | Mod: 50

## 2022-10-17 NOTE — PROCEDURE
[FreeTextEntry1] : BLE sclerotherapy  [FreeTextEntry2] : spider and reticular veins on legs bilaterally  [FreeTextEntry3] : Explained sclerotherapy procedure to patient and obtained verbal consent. All questions answered prior to procedure. 70% alcohol used to prep legs prior to injection. 1% polidocanol injected into BLE reticular veins through a 30 gauge needle. Over 20 injections into legs bilaterally. Sterile gauze and ACE wraps applied to legs bilaterally. Pt tolerated procedure well.\par \par \par

## 2022-11-09 ENCOUNTER — APPOINTMENT (OUTPATIENT)
Dept: ORTHOPEDIC SURGERY | Facility: CLINIC | Age: 67
End: 2022-11-09

## 2022-11-09 VITALS — WEIGHT: 233 LBS | HEIGHT: 68 IN | BODY MASS INDEX: 35.31 KG/M2

## 2022-11-09 PROCEDURE — 99072 ADDL SUPL MATRL&STAF TM PHE: CPT

## 2022-11-09 PROCEDURE — 99213 OFFICE O/P EST LOW 20 MIN: CPT

## 2022-11-09 NOTE — HISTORY OF PRESENT ILLNESS
[Gradual] : gradual [Dull/Aching] : dull/aching [Sharp] : sharp [Constant] : constant [Household chores] : household chores [Meds] : meds [Ice] : ice [Heat] : heat [Physical therapy] : physical therapy [Walking] : walking [Stairs] : stairs [Not working due to injury] : Work status: not working due to injury [Lower back] : lower back [Work related] : work related [Sudden] : sudden [5] : 5 [Burning] : burning [Localized] : localized [Shooting] : shooting [Stabbing] : stabbing [Sitting] : sitting [Standing] : standing [de-identified] : Patient Complaint -  8/6/18/2018 Case [id number]\par 6/3/20: Bilateral thigh pain/burning x 1 week. History of lumbar condition/pain mgt STEVEN's and treatment. Right leg giving\par way episode. Also c/o R knee pain. Requesting injection.\par 6/10/20: f/u R knee Euflexxa #2\par 6/17/20: f/u R knee euflexxa #3, continued right leg weakness. Burning thighs - radiates to legs. Awaiting auth for MRI\par Lumbar spine. Pain knees with ambulation, buckling. Swelling R ankle.\par 7/15/20 f/u back and bilat knees MRI auth pending\par 8/24/2020: f/u margie knees better w/PT MRI LS auth pending\par 9/30/20 f/u R ankle and both knees\par 10/28/20 f/u R heel and margie knees HEP/PT MRI auth pending Not working\par 12/9/20 f/u R knee pain, bilateral radiating burning post leg pain and R achilles. HE/PT , Not working auth pending\par 1/20/21 Euflexxa #1 margie knees LBP radiating to margie thighs/knees\par 1/27/21 Euflexxa # 2 margie knees lbp radiating to legs. Waiting for auth for mri lumbar spine.\par 2/10/21: Euflexxa #3 margie knees. Just got auth for EMG.\par 3/10/21 f/u radiating lbp to margie thighs, knee pain HEP/PT\par 4/14/21: f/u L > R radiating leg pain to knees. Pain mgt PT Not working\par 5/26/21 f/u lbp radiating L > R postero-lateral thigh and ant med knee. HEP/PT/Pain mgt Not working\par 7/7/21 f/u lbp radiating to post thighs and L > R knee. Pain mgt pending\par 8/25/21 f/u b/l knees - pain with stairs and prolonged standing/ambulation. Aggravates R ankle issue and lb. Continued\par soreness. STEVEN's not auth. Following with pain mgmt. Dr. Cooper. > Left leg radiating pain.\par 9/29/21 f/u margie knees, low back. PT not authorized. Not working\par 12/29/21 f/u margie knees LS. Recent vein surgery Increasing lateral L knee poain. Pain mgt/STEVEN pending\par 2/9/22 f/u margie knees. pain mgmt. STEVEN pending.\par 6/22/22  f/u margie knees, back  Considering tka L > R at end of year\par 8/3/22  f/u margie knees lumbar spine  PT auth pending  Not working  Takes Naprosyn/Rheum\par 9/28/22  f/u lbp, margie knees.  Restarted PT  Pain Mgt  Takes MTx and Naprosyn prnNot working\par 11/9/22 f/u lbp, margie knees. She saw Dr. Godoy, pain mgmt, Dr. Starks, rheum on MTx, naprosyn helps her manage. She is scheduled for margie TKA 2/6/22 (Dr. Grewal) she continues to benefit from PT. MRI Lumbar spine - sacroiliits. Increased difficulty with ambulation, sense of leg heaviness.  [] : no [FreeTextEntry1] : knees and ankle [FreeTextEntry3] : 8-5-18 [FreeTextEntry5] : rt knee worse than the left  [FreeTextEntry6] : soreness [FreeTextEntry7] : from the back to the knee [FreeTextEntry8] : sometime  wakes up at night  [FreeTextEntry9] : naproxen [de-identified] : pt has been to pain management

## 2022-11-09 NOTE — REVIEW OF SYSTEMS
[Joint Pain] : joint pain [Joint Stiffness] : joint stiffness [Joint Swelling] : joint swelling [Negative] : Heme/Lymph [Muscle Weakness] : muscle weakness [FreeTextEntry9] : legs get tired

## 2022-11-22 ENCOUNTER — APPOINTMENT (OUTPATIENT)
Dept: RHEUMATOLOGY | Facility: CLINIC | Age: 67
End: 2022-11-22

## 2022-12-28 ENCOUNTER — APPOINTMENT (OUTPATIENT)
Dept: ORTHOPEDIC SURGERY | Facility: CLINIC | Age: 67
End: 2022-12-28

## 2023-01-04 ENCOUNTER — RX RENEWAL (OUTPATIENT)
Age: 68
End: 2023-01-04

## 2023-01-04 RX ORDER — SYRINGE AND NEEDLE,INSULIN,1ML 25GX1"
25G X 5/8" SYRINGE, EMPTY DISPOSABLE MISCELLANEOUS
Qty: 1 | Refills: 5 | Status: ACTIVE | COMMUNITY
Start: 2021-02-03 | End: 1900-01-01

## 2023-01-11 ENCOUNTER — APPOINTMENT (OUTPATIENT)
Dept: ORTHOPEDIC SURGERY | Facility: CLINIC | Age: 68
End: 2023-01-11
Payer: OTHER MISCELLANEOUS

## 2023-01-11 VITALS — HEIGHT: 68 IN | WEIGHT: 233 LBS | BODY MASS INDEX: 35.31 KG/M2

## 2023-01-11 DIAGNOSIS — M54.9 DORSALGIA, UNSPECIFIED: ICD-10-CM

## 2023-01-11 PROCEDURE — 99072 ADDL SUPL MATRL&STAF TM PHE: CPT

## 2023-01-11 PROCEDURE — 99213 OFFICE O/P EST LOW 20 MIN: CPT

## 2023-01-11 NOTE — DISCUSSION/SUMMARY
[de-identified] : Continue therapy. \par Naprosyn as needed. \par Patient takes naprosyn only as needed. \par She has been taking this without any adverse reactions/side effectis.\par Counseled on possible GI bleeding. \par f/u 3 months. \par \par Entered by Melanie CHURCH acting as a scribe.\par Instructions: Dr. Goodwin- The documentation recorded by the scribe accurately reflects the service I personally performed and the decisions made by me.\par \par

## 2023-01-11 NOTE — HISTORY OF PRESENT ILLNESS
[Lower back] : lower back [Work related] : work related [Gradual] : gradual [Sudden] : sudden [Burning] : burning [Dull/Aching] : dull/aching [Sharp] : sharp [Shooting] : shooting [Stabbing] : stabbing [Constant] : constant [Household chores] : household chores [Meds] : meds [Ice] : ice [Heat] : heat [Physical therapy] : physical therapy [Sitting] : sitting [Standing] : standing [Walking] : walking [Stairs] : stairs [Not working due to injury] : Work status: not working due to injury [7] : 7 [Radiating] : radiating [de-identified] : Patient Complaint -  8/6/18/2018 Case [id number]\par 6/3/20: Bilateral thigh pain/burning x 1 week. History of lumbar condition/pain mgt STEVEN's and treatment. Right leg giving\par way episode. Also c/o R knee pain. Requesting injection.\par 6/10/20: f/u R knee Euflexxa #2\par 6/17/20: f/u R knee euflexxa #3, continued right leg weakness. Burning thighs - radiates to legs. Awaiting auth for MRI\par Lumbar spine. Pain knees with ambulation, buckling. Swelling R ankle.\par 7/15/20 f/u back and bilat knees MRI auth pending\par 8/24/2020: f/u margie knees better w/PT MRI LS auth pending\par 9/30/20 f/u R ankle and both knees\par 10/28/20 f/u R heel and margie knees HEP/PT MRI auth pending Not working\par 12/9/20 f/u R knee pain, bilateral radiating burning post leg pain and R achilles. HE/PT , Not working auth pending\par 1/20/21 Euflexxa #1 margie knees LBP radiating to margie thighs/knees\par 1/27/21 Euflexxa # 2 margie knees lbp radiating to legs. Waiting for auth for mri lumbar spine.\par 2/10/21: Euflexxa #3 margie knees. Just got auth for EMG.\par 3/10/21 f/u radiating lbp to margie thighs, knee pain HEP/PT\par 4/14/21: f/u L > R radiating leg pain to knees. Pain mgt PT Not working\par 5/26/21 f/u lbp radiating L > R postero-lateral thigh and ant med knee. HEP/PT/Pain mgt Not working\par 7/7/21 f/u lbp radiating to post thighs and L > R knee. Pain mgt pending\par 8/25/21 f/u b/l knees - pain with stairs and prolonged standing/ambulation. Aggravates R ankle issue and lb. Continued\par soreness. STEVEN's not auth. Following with pain mgmt. Dr. Cooper. > Left leg radiating pain.\par 9/29/21 f/u margie knees, low back. PT not authorized. Not working\par 12/29/21 f/u margie knees LS. Recent vein surgery Increasing lateral L knee poain. Pain mgt/STEVEN pending\par 2/9/22 f/u margie knees. pain mgmt. STEVEN pending.\par 6/22/22  f/u margie knees, back  Considering tka L > R at end of year\par 8/3/22  f/u margie knees lumbar spine  PT auth pending  Not working  Takes Naprosyn/Rheum\par 9/28/22  f/u lbp, margie knees.  Restarted PT  Pain Mgt  Takes MTx and Naprosyn prnNot working\par 11/9/22 f/u lbp, margie knees. She saw Dr. Godoy, pain mgmt, Dr. Starks, rheum on MTx, naprosyn helps her manage. She is scheduled for margie TKA 2/6/22 (Dr. Grewal) she continues to benefit from PT. MRI Lumbar spine - sacroiliits. Increased difficulty with ambulation, sense of leg heaviness. \par 1/11/23: f/u lbp, margie knees. She is attending PT. Waiting for auth to have TKR done.  [] : no [FreeTextEntry1] : knees and ankle [FreeTextEntry3] : 8-5-18 [FreeTextEntry5] : rt knee worse than the left  [FreeTextEntry6] : soreness [FreeTextEntry7] : from the back to the knee [FreeTextEntry8] : sometime  wakes up at night  [FreeTextEntry9] : taping knees [de-identified] : pt has been to pain management

## 2023-01-24 ENCOUNTER — FORM ENCOUNTER (OUTPATIENT)
Age: 68
End: 2023-01-24

## 2023-02-02 ENCOUNTER — APPOINTMENT (OUTPATIENT)
Dept: RHEUMATOLOGY | Facility: CLINIC | Age: 68
End: 2023-02-02
Payer: MEDICARE

## 2023-02-02 ENCOUNTER — NON-APPOINTMENT (OUTPATIENT)
Age: 68
End: 2023-02-02

## 2023-02-02 VITALS
SYSTOLIC BLOOD PRESSURE: 110 MMHG | BODY MASS INDEX: 34.1 KG/M2 | WEIGHT: 225 LBS | DIASTOLIC BLOOD PRESSURE: 70 MMHG | OXYGEN SATURATION: 99 % | HEIGHT: 68 IN | TEMPERATURE: 98.1 F | HEART RATE: 68 BPM

## 2023-02-02 DIAGNOSIS — R70.0 ELEVATED ERYTHROCYTE SEDIMENTATION RATE: ICD-10-CM

## 2023-02-02 PROCEDURE — 99214 OFFICE O/P EST MOD 30 MIN: CPT

## 2023-02-02 NOTE — HISTORY OF PRESENT ILLNESS
[FreeTextEntry1] : 66 y/o woman with hx of venous insufficiency, OA, infiltrative basal cell carcinoma 11/2020, vein surgery 12/2021, right ruptured achilles tendon, and chronic back pain here for f/u of seronegative RA.\par \par She started MTX 8/2020 and has felt improvement. Completed a course of prednisone. \par Denies any issues with MTX, no oral sores, rashes, hair loss, abdominal pain, cough, sob. \par She was switched to MTX subQ now on 20mg subQ.\par \par Stopped humira. We did discuss cosentyx at one point, however she still wants to hold off. She is still getting insurance sorted out. \par \par Pain level\par She is on naproxen 500mg and baclofen. She takes naproxen about 3x/ week. it's usually for general overall pain. Low back is worse than other area. \par Mobic didn't help. \par She knows she needs a left knee replacement. Planning to see Dr. Grewal. If her left knee was taken care of, she reports her pain level overall would likely be a 3-4/10. Plans to get both knees done. \par No significant issue in hands. \par MRI showed b/L sacroiliitis. \par \par MRI left knee shows complex tear meniscus, tendinosis, ganglion cyst, small joint effusion with synovitis, baker's cyst. \par She finished gel shots for knees. \par Her orthopedic thinks it's coming from her back, but now she is not so sure. \par \par MRI right knee shows 11/13/20 meniscal tear, partial rupture of para meniscal cyst. Small joint effusion, chondromalacia. \par \par Vit D 2000IU daily \par

## 2023-02-02 NOTE — ASSESSMENT
[FreeTextEntry1] : -Latest ESR/CRP was normal. \par -Rx prednisone 20mg daily x 5 days, then 10mg daily x 5 days, then stop\par -Will hold off on humira for now. Discussed cosentyx, however she reports she is having insurance changes, and would prefer to wait until that is done to start cosentyx. \par - MRI pelvis shows sacroiliitis. XR hands, unremarkable. \par -labs reviewed, neg RF/CCP, Elevated ESR/CRP\par -Requesting refill for naproxen. Rx naproxen 500mg BID PRN. She uses it about 3x/week. \par -cont subQ MTX 20mg q week for now. \par -Patient is aware to hold MTX if develops infection. Patient is aware to avoid alcohol while on this medication. \par -Rx folc acid 1mg daily\par -Previously responsive to oral prednisone. \par \par Derm\par -sees Dr. Valero\par \par OA knees/knee pain\par -IA depomedrol 80mg b/L was not all that helpful. \par -MRI left knee as above.\par -Now going to see Dr. Grewal for possible knee replacement on left. Advised to hold humira in the two weeks prior to surgery if it will be scheduled.   OK to continue MTX through surgery.  \par \par Right PAB- not active\par -80mg depomedrol injection given previously\par \par Back pain-initially improved with med\par -has not used the Rx tizanidine 2mg QHS for back pain. To use if needed in the future. \par \par NSAID use\par -Counseled on naproxen 500mg use.  Declining use of lower dose.  \par -Cr is borderline elevated.  Advised minimal use off naproxen. \par \par Advised to establish with a new PCP as her old one doesn't take her insurance.  \par \par rpa 3 months, sooner if needed. Labs before next visit. \par  \par \par

## 2023-02-07 ENCOUNTER — APPOINTMENT (OUTPATIENT)
Dept: ORTHOPEDIC SURGERY | Facility: HOSPITAL | Age: 68
End: 2023-02-07

## 2023-02-12 ENCOUNTER — FORM ENCOUNTER (OUTPATIENT)
Age: 68
End: 2023-02-12

## 2023-02-15 ENCOUNTER — APPOINTMENT (OUTPATIENT)
Dept: ORTHOPEDIC SURGERY | Facility: CLINIC | Age: 68
End: 2023-02-15
Payer: OTHER MISCELLANEOUS

## 2023-02-15 VITALS
HEART RATE: 78 BPM | HEIGHT: 68 IN | BODY MASS INDEX: 34.1 KG/M2 | SYSTOLIC BLOOD PRESSURE: 124 MMHG | DIASTOLIC BLOOD PRESSURE: 69 MMHG | WEIGHT: 225 LBS

## 2023-02-15 PROCEDURE — 73562 X-RAY EXAM OF KNEE 3: CPT | Mod: 50

## 2023-02-15 PROCEDURE — 99214 OFFICE O/P EST MOD 30 MIN: CPT

## 2023-02-15 PROCEDURE — 99072 ADDL SUPL MATRL&STAF TM PHE: CPT

## 2023-02-15 NOTE — DISCUSSION/SUMMARY
[Medication Risks Reviewed] : Medication risks reviewed [Surgical risks reviewed] : Surgical risks reviewed [de-identified] : 66 y/o F with advanced medial and patellofemoral osteoarthritis of both knees. The nature of her condition and treatment options were discussed in detail. She was scheduled for a simultaneous bilateral TKA, but was cancelled. She states her pain is severe and she fails conservative therapies. She received cortisone injections and HA injections in the past which were not helpful. Her quality of life has been decreasing and would like to do surgery as soon as possible. We discussed conducting a simultaneous bilateral TKA since she does not have major risk factors for infection. She will once again talk with the surgical coordinator to schedule a simultaneous bilateral TKA.  If we cannot get authorization for simultaneous bilateral total knee arthroplasty we will likely consider pursuing her left TKA knee first\par \par The patient is a 67 year individual with end stage arthritis of their b/l knee joint. Based upon the patient's continued symptoms and failure to respond to conservative treatment (including HA injections, cortisone injections, over the counter medications, and PT) I have recommended a b/l total knee arthroplasty for this patient. A long discussion took place with the patient describing what a total joint replacement is and what the procedure would entail. A total knee arthroplasty model, similar to the implant that was used during the operation, was utilized to demonstrate and to discuss the various bearing surfaces of the implants. The hospitalization and post-operative care and rehabilitation were also discussed. The use of perioperative antibiotics and DVT prophylaxis were discussed. The risk, benefits and alternatives to a surgical intervention were discussed at length with the patient. The patient was also advised of risks related to the medical comorbidities, elevated body mass index (BMI), and smoking where applicable. We discussed how to reduce modifiable risk factors and encouraged smoking cessation were applicable.. A lengthy discussion took place to review the most common complications including but not limited to: deep vein thrombosis, pulmonary embolus, heart attack, stroke, infection, wound breakdown, numbness, damage to nerves, tendon, muscles, arteries or other blood vessels, death and other possible complications from anesthesia. The patient was told that we will take steps to minimize these risks by using sterile technique, antibiotics and DVT prophylaxis when appropriate and follow the patient postoperatively in the office setting to monitor progress. The possibility of recurrent pain, no improvement in pain and actual worsening of pain were also discussed with the patient.\par The discharge plan of care focused on the patient going home following surgery. The patient was encouraged to make the necessary arrangements to have someone stay with them when they are discharged home. Following discharge, a home care nurse was to the patient. The home care nurse would open the patient’s home care case and request home physical therapy services. Home physical therapy was to commence following discharge provided it was appropriate and covered by the health insurance benefit plan. \par The benefits of surgery were discussed with the patient including the potential for improving her current clinical condition through operative intervention. Alternatives to surgical intervention including continued conservative management were also discussed in detail. All questions were answered to the satisfaction of the patient. The treatment plan of care, as well as a model of a total knee arthroplasty equivalent to the one that will be used for their total joint replacement, was shared with the patient. The patient agreed to the plan of care as well as the use of implants in their total joint replacement.

## 2023-02-15 NOTE — END OF VISIT
[FreeTextEntry3] : I, Becky Schaffer, acted solely as a scribe for Dr. Jose Alfredo Grewal on 02/15/2023

## 2023-02-15 NOTE — HISTORY OF PRESENT ILLNESS
[Has the patient missed work because of the injury/illness?] : The patient has missed work because of the injury/illness. [No] : The patient is currently not working. [de-identified] : 68 y/o female b/l knee pain. She has more pain in the left knee than in the right knee. She has a hx of advanced medial and patellofemoral osteoarthritis of both knees. She has been in PT which has been helping her a little. However, she states her pain has been worsening and conservative treatment such as HA injections and cortisone injections were unhelpful. She was scheduled for simultaneous bilateral TKA on February 7th, 2023 which she cancelled. She has a hx of RA on methotrexate and Humira injections every 2 weeks.  She is under pain management with Dr. Curtis who has given her epidural injections. \par  [FreeTextEntry1] : She fell on 8/5/18 and landed on both knees. She sustained a right ACL tendon rupture which was treated nonoperatively by an orthopedic surgeon. She describes her pain as sharp achy throbbing, stabbing and shooting. She has the most pain with stairs. Her last day of work was in February 2019--she is no longer working and is on disability by the orthopedist who is following her ankles.  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

## 2023-02-15 NOTE — PHYSICAL EXAM
[Antalgic] : antalgic [LE] : Sensory: Intact in bilateral lower extremities [ALL] : dorsalis pedis, posterior tibial, femoral, popliteal, and radial 2+ and symmetric bilaterally [Normal] : Alert and in no acute distress [Poor Appearance] : well-appearing [de-identified] : GENERAL APPEARANCE: Well nourished and hydrated, pleasant, alert, and oriented x 3. Appears their stated age. \par HEENT: Normocephalic, extraocular eye motion intact. Nasal septum midline. Oral cavity clear. External auditory canal clear. \par RESPIRATORY: Breath sounds clear and audible in all lobes. No wheezing, No accessory muscle use.\par CARDIOVASCULAR: No apparent abnormalities. No lower leg edema. No varicosities. Pedal pulses are palpable.\par NEUROLOGIC: Sensation is normal, no muscle weakness in the upper or lower extremities.\par DERMATOLOGIC: No apparent skin lesions, moist, warm, no rash.\par SPINE: Cervical spine appears normal and moves freely; thoracic spine appears normal and moves freely; lumbosacral spine appears normal and moves freely, normal, nontender.\par MUSCULOSKELETAL: Hands, wrists, and elbows are normal and move freely, shoulders are normal and move freely.  [de-identified] : b/l knee exam shows varus alignment, ROM is 0-115 degree with pain\par  [de-identified] : 5V xray of the b/l knee done in the office today and reviewed by Dr. Jose Alfredo Grewal demonstrates bone-on-bone medial compartment osteoarthritis with varus deformity

## 2023-03-01 ENCOUNTER — APPOINTMENT (OUTPATIENT)
Dept: ORTHOPEDIC SURGERY | Facility: CLINIC | Age: 68
End: 2023-03-01
Payer: OTHER MISCELLANEOUS

## 2023-03-01 ENCOUNTER — APPOINTMENT (OUTPATIENT)
Dept: ORTHOPEDIC SURGERY | Facility: CLINIC | Age: 68
End: 2023-03-01

## 2023-03-01 VITALS — HEIGHT: 68 IN | BODY MASS INDEX: 34.1 KG/M2 | WEIGHT: 225 LBS

## 2023-03-01 PROCEDURE — 99213 OFFICE O/P EST LOW 20 MIN: CPT

## 2023-03-01 PROCEDURE — 99072 ADDL SUPL MATRL&STAF TM PHE: CPT

## 2023-03-01 NOTE — HISTORY OF PRESENT ILLNESS
[Lower back] : lower back [Work related] : work related [Gradual] : gradual [Sudden] : sudden [6] : 6 [Dull/Aching] : dull/aching [Radiating] : radiating [Sharp] : sharp [Shooting] : shooting [Intermittent] : intermittent [Household chores] : household chores [Meds] : meds [Ice] : ice [Heat] : heat [Standing] : standing [Walking] : walking [Stairs] : stairs [Not working due to injury] : Work status: not working due to injury [de-identified] : Patient Complaint -  8/6/18/2018 Case [id number]\par 6/3/20: Bilateral thigh pain/burning x 1 week. History of lumbar condition/pain mgt STEVEN's and treatment. Right leg giving\par way episode. Also c/o R knee pain. Requesting injection.\par 6/10/20: f/u R knee Euflexxa #2\par 6/17/20: f/u R knee euflexxa #3, continued right leg weakness. Burning thighs - radiates to legs. Awaiting auth for MRI\par Lumbar spine. Pain knees with ambulation, buckling. Swelling R ankle.\par 7/15/20 f/u back and bilat knees MRI auth pending\par 8/24/2020: f/u margie knees better w/PT MRI LS auth pending\par 9/30/20 f/u R ankle and both knees\par 10/28/20 f/u R heel and margie knees HEP/PT MRI auth pending Not working\par 12/9/20 f/u R knee pain, bilateral radiating burning post leg pain and R achilles. HE/PT , Not working auth pending\par 1/20/21 Euflexxa #1 margie knees LBP radiating to margie thighs/knees\par 1/27/21 Euflexxa # 2 margie knees lbp radiating to legs. Waiting for auth for mri lumbar spine.\par 2/10/21: Euflexxa #3 margie knees. Just got auth for EMG.\par 3/10/21 f/u radiating lbp to margie thighs, knee pain HEP/PT\par 4/14/21: f/u L > R radiating leg pain to knees. Pain mgt PT Not working\par 5/26/21 f/u lbp radiating L > R postero-lateral thigh and ant med knee. HEP/PT/Pain mgt Not working\par 7/7/21 f/u lbp radiating to post thighs and L > R knee. Pain mgt pending\par 8/25/21 f/u b/l knees - pain with stairs and prolonged standing/ambulation. Aggravates R ankle issue and lb. Continued\par soreness. STEVEN's not auth. Following with pain mgmt. Dr. Cooper. > Left leg radiating pain.\par 9/29/21 f/u margie knees, low back. PT not authorized. Not working\par 12/29/21 f/u margie knees LS. Recent vein surgery Increasing lateral L knee poain. Pain mgt/STEVEN pending\par 2/9/22 f/u margie knees. pain mgmt. STEVEN pending.\par 6/22/22  f/u margie knees, back  Considering tka L > R at end of year\par 8/3/22  f/u margie knees lumbar spine  PT auth pending  Not working  Takes Naprosyn/Rheum\par 9/28/22  f/u lbp, margie knees.  Restarted PT  Pain Mgt  Takes MTx and Naprosyn prnNot working\par 11/9/22 f/u lbp, margie knees. She saw Dr. Godoy, pain mgmt, Dr. Starks, rheum on MTx, naprosyn helps her manage. She is scheduled for margie TKA 2/6/22 (Dr. Grewal) she continues to benefit from PT. MRI Lumbar spine - sacroiliits. Increased difficulty with ambulation, sense of leg heaviness. \par 1/11/23: f/u lbp, margie knees. She is attending PT. Waiting for auth to have TKR done. \par 3/1/23: f/u lbp, margie knees, R achilles.  Still waiting for Auth for TKR. Just finished PT.  Symptoms the same.  [] : no [FreeTextEntry1] : knees and ankle [FreeTextEntry3] : 8-5-18 [FreeTextEntry5] : left worse then the right  [FreeTextEntry6] : soreness [FreeTextEntry7] : from the back to the knee [FreeTextEntry8] : sometime  wakes up at night  [FreeTextEntry9] : Stim. [de-identified] : over use

## 2023-03-01 NOTE — REVIEW OF SYSTEMS
[Joint Pain] : joint pain [Joint Stiffness] : joint stiffness [Negative] : Heme/Lymph [Joint Swelling] : no joint swelling [Muscle Weakness] : no muscle weakness

## 2023-03-01 NOTE — DISCUSSION/SUMMARY
[de-identified] : Continue therapy. \par Naprosyn as needed. \par Patient takes naprosyn only as needed. \par She has been taking this without any adverse reactions/side effects.\par Counseled on possible GI bleeding. \par f/u 3 months. \par \par Entered by Melanie CHURCH acting as a scribe.\par Instructions: Dr. Goodwin- The documentation recorded by the scribe accurately reflects the service I personally performed and the decisions made by me.\par \par

## 2023-03-28 ENCOUNTER — APPOINTMENT (OUTPATIENT)
Dept: ORTHOPEDIC SURGERY | Facility: CLINIC | Age: 68
End: 2023-03-28

## 2023-04-28 ENCOUNTER — APPOINTMENT (OUTPATIENT)
Dept: ORTHOPEDIC SURGERY | Facility: CLINIC | Age: 68
End: 2023-04-28

## 2023-05-03 ENCOUNTER — APPOINTMENT (OUTPATIENT)
Dept: ORTHOPEDIC SURGERY | Facility: CLINIC | Age: 68
End: 2023-05-03
Payer: OTHER MISCELLANEOUS

## 2023-05-03 ENCOUNTER — APPOINTMENT (OUTPATIENT)
Dept: ORTHOPEDIC SURGERY | Facility: CLINIC | Age: 68
End: 2023-05-03

## 2023-05-03 VITALS — BODY MASS INDEX: 34.1 KG/M2 | HEIGHT: 68 IN | WEIGHT: 225 LBS

## 2023-05-03 PROCEDURE — 99214 OFFICE O/P EST MOD 30 MIN: CPT

## 2023-05-03 NOTE — WORK
[No Rx restrictions] : No Rx restrictions. [I provided the services listed above] :  I provided the services listed above. [Cannot return to work because ________] : cannot return to work because [unfilled] [FreeTextEntry1] : poor

## 2023-05-03 NOTE — HISTORY OF PRESENT ILLNESS
[Lower back] : lower back [Work related] : work related [Gradual] : gradual [Sudden] : sudden [6] : 6 [Dull/Aching] : dull/aching [Radiating] : radiating [Sharp] : sharp [Shooting] : shooting [Intermittent] : intermittent [Household chores] : household chores [Meds] : meds [Ice] : ice [Heat] : heat [Standing] : standing [Walking] : walking [Stairs] : stairs [Not working due to injury] : Work status: not working due to injury [de-identified] : Patient Complaint -  8/6/18/2018 Case [id number]\par 6/3/20: Bilateral thigh pain/burning x 1 week. History of lumbar condition/pain mgt STEVEN's and treatment. Right leg giving\par way episode. Also c/o R knee pain. Requesting injection.\par 6/10/20: f/u R knee Euflexxa #2\par 6/17/20: f/u R knee euflexxa #3, continued right leg weakness. Burning thighs - radiates to legs. Awaiting auth for MRI\par Lumbar spine. Pain knees with ambulation, buckling. Swelling R ankle.\par 7/15/20 f/u back and bilat knees MRI auth pending\par 8/24/2020: f/u margie knees better w/PT MRI LS auth pending\par 9/30/20 f/u R ankle and both knees\par 10/28/20 f/u R heel and margie knees HEP/PT MRI auth pending Not working\par 12/9/20 f/u R knee pain, bilateral radiating burning post leg pain and R achilles. HE/PT , Not working auth pending\par 1/20/21 Euflexxa #1 margie knees LBP radiating to margie thighs/knees\par 1/27/21 Euflexxa # 2 margie knees lbp radiating to legs. Waiting for auth for mri lumbar spine.\par 2/10/21: Euflexxa #3 amrgie knees. Just got auth for EMG.\par 3/10/21 f/u radiating lbp to margie thighs, knee pain HEP/PT\par 4/14/21: f/u L > R radiating leg pain to knees. Pain mgt PT Not working\par 5/26/21 f/u lbp radiating L > R postero-lateral thigh and ant med knee. HEP/PT/Pain mgt Not working\par 7/7/21 f/u lbp radiating to post thighs and L > R knee. Pain mgt pending\par 8/25/21 f/u b/l knees - pain with stairs and prolonged standing/ambulation. Aggravates R ankle issue and lb. Continued\par soreness. STEVEN's not auth. Following with pain mgmt. Dr. Cooper. > Left leg radiating pain.\par 9/29/21 f/u margie knees, low back. PT not authorized. Not working\par 12/29/21 f/u margie knees LS. Recent vein surgery Increasing lateral L knee poain. Pain mgt/STEVEN pending\par 2/9/22 f/u margie knees. pain mgmt. STEVEN pending.\par 6/22/22  f/u margie knees, back  Considering tka L > R at end of year\par 8/3/22  f/u margie knees lumbar spine  PT auth pending  Not working  Takes Naprosyn/Rheum\par 9/28/22  f/u lbp, margie knees.  Restarted PT  Pain Mgt  Takes MTx and Naprosyn prnNot working\par 11/9/22 f/u lbp, margie knees. She saw Dr. Godoy, pain mgmt, Dr. Starks, rheum on MTx, naprosyn helps her manage. She is scheduled for margie TKA 2/6/22 (Dr. Grewal) she continues to benefit from PT. MRI Lumbar spine - sacroiliits. Increased difficulty with ambulation, sense of leg heaviness. \par 1/11/23: f/u lbp, margie knees. She is attending PT. Waiting for auth to have TKR done. \par 3/1/23: f/u lbp, margie knees, R achilles.  Still waiting for Auth for TKR. Just finished PT.  Symptoms the same. \par 5/3/23  f/u margie knees l > r , back, R achilles.  Scheduled margie tka 7/18/23 [] : no [FreeTextEntry1] : knees and ankle [FreeTextEntry3] : 8-5-18 [FreeTextEntry7] : from the back to the knee [FreeTextEntry8] : sometime  wakes up at night  [FreeTextEntry9] : Stim., exercise class [facility] bec no PT [de-identified] : over use [de-identified] : bilat knee sx was approved

## 2023-05-18 ENCOUNTER — APPOINTMENT (OUTPATIENT)
Dept: RHEUMATOLOGY | Facility: CLINIC | Age: 68
End: 2023-05-18
Payer: MEDICARE

## 2023-05-18 VITALS
SYSTOLIC BLOOD PRESSURE: 118 MMHG | TEMPERATURE: 97.1 F | WEIGHT: 214 LBS | OXYGEN SATURATION: 100 % | HEART RATE: 68 BPM | DIASTOLIC BLOOD PRESSURE: 72 MMHG | BODY MASS INDEX: 32.54 KG/M2

## 2023-05-18 PROCEDURE — 99214 OFFICE O/P EST MOD 30 MIN: CPT

## 2023-05-18 RX ORDER — NAPROXEN 500 MG/1
500 TABLET ORAL
Qty: 60 | Refills: 1 | Status: DISCONTINUED | COMMUNITY
Start: 2022-07-26 | End: 2023-05-18

## 2023-05-18 RX ORDER — PREDNISONE 10 MG/1
10 TABLET ORAL
Qty: 15 | Refills: 0 | Status: DISCONTINUED | COMMUNITY
Start: 2022-10-11 | End: 2023-05-18

## 2023-05-18 RX ORDER — TRIAMCINOLONE ACETONIDE 1 MG/G
0.1 CREAM TOPICAL
Qty: 80 | Refills: 0 | Status: DISCONTINUED | COMMUNITY
Start: 2022-07-01 | End: 2023-05-18

## 2023-05-18 NOTE — HISTORY OF PRESENT ILLNESS
[FreeTextEntry1] : 66 y/o woman with hx of venous insufficiency, OA, infiltrative basal cell carcinoma 11/2020, vein surgery 12/2021, right ruptured achilles tendon, and chronic back pain here for f/u of seronegative RA.\par \par She started MTX 8/2020 and has felt improvement. Completed a course of prednisone. \par Denies any issues with MTX, no oral sores, rashes, hair loss, abdominal pain, cough, sob. \par She was switched to MTX subQ now on 20mg subQ.\par \par Stopped humira. We did discuss cosentyx at one point, however she still wants to hold off. She is still getting insurance sorted out. \par \par She has pain in her knees. \par All in all she feels good, her back is sore if she overdoes it.  \par \par Her right shoulder, when she does arm exercises.  It feels like there's arthritis there with grinding.No pain. \par \par Pain level 5/10, mainly knees and low back.\par She is on naproxen 500mg. She takes naproxen about 1x/ week. it's usually for general overall pain. \par Mobic didn't help. \par Planning to have knee b/L 7/18/23 with Dr. Grewal. She has lost weight intentionally for the surgery.  \par No significant issue in hands. \par MRI showed b/L sacroiliitis. \par AM stiffness is 5 min.\par \par MRI left knee shows complex tear meniscus, tendinosis, ganglion cyst, small joint effusion with synovitis, baker's cyst. \par She finished gel shots for knees. \par Her orthopedic thinks it's coming from her back, but now she is not so sure. \par \par MRI right knee shows 11/13/20 meniscal tear, partial rupture of para meniscal cyst. Small joint effusion, chondromalacia. \par \par Vit D 2000IU daily \par \par \par \par

## 2023-05-18 NOTE — ASSESSMENT
[FreeTextEntry1] : Seronegative arthritis\par -Latest ESR/CRP was normal. \par -Will hold off on humira for now. Discussed cosentyx, however she reports she is having insurance changes, and would prefer to wait until that is done to start cosentyx. Says her insurance appointemnt is in 08/2023.  \par - MRI pelvis shows sacroiliitis. XR hands, unremarkable. \par -labs reviewed, neg RF/CCP, Elevated ESR/CRP\par -Requesting refill for naproxen. Rx naproxen 500mg BID PRN. She uses it about 1x/week. \par -cont subQ MTX 20mg q week for now. \par -Patient is aware to hold MTX if develops infection. Patient is aware to avoid alcohol while on this medication. \par -Rx folc acid 1mg daily\par -Previously responsive to oral prednisone. \par \par Right shoulder clicking.  Has no pain, just clicking.  \par -XR shoulder\par \par Derm\par -sees Dr. Valero\par \par OA knees/knee pain\par -IA depomedrol 80mg b/L was not all that helpful. \par -MRI left knee as above.\par -b/L knee replacement planned for 7/18/23.  OK to continue MTX through surgery.  Advised to hold naproxen 7 day before surgery.  \par \par Right PAB- not active\par -80mg depomedrol injection given previously\par \par Back pain-initially improved with med\par -has not used the Rx tizanidine 2mg QHS for back pain. To use if needed in the future. \par \par NSAID use\par -Counseled on naproxen 500mg use. Declining use of lower dose. \par -Cr is borderline elevated. Advised minimal use off naproxen. \par \par Advised to establish with a new PCP as her old one doesn't take her insurance. \par \par rpa 3 months, sooner if needed. Labs before next visit. \par  \par \par

## 2023-05-26 ENCOUNTER — NON-APPOINTMENT (OUTPATIENT)
Age: 68
End: 2023-05-26

## 2023-06-28 ENCOUNTER — OUTPATIENT (OUTPATIENT)
Dept: OUTPATIENT SERVICES | Facility: HOSPITAL | Age: 68
LOS: 1 days | End: 2023-06-28
Payer: COMMERCIAL

## 2023-06-28 VITALS
RESPIRATION RATE: 12 BRPM | TEMPERATURE: 98 F | HEIGHT: 68 IN | OXYGEN SATURATION: 98 % | WEIGHT: 207.23 LBS | SYSTOLIC BLOOD PRESSURE: 118 MMHG | DIASTOLIC BLOOD PRESSURE: 62 MMHG | HEART RATE: 64 BPM

## 2023-06-28 DIAGNOSIS — Z98.890 OTHER SPECIFIED POSTPROCEDURAL STATES: Chronic | ICD-10-CM

## 2023-06-28 DIAGNOSIS — Z13.89 ENCOUNTER FOR SCREENING FOR OTHER DISORDER: ICD-10-CM

## 2023-06-28 DIAGNOSIS — M17.0 BILATERAL PRIMARY OSTEOARTHRITIS OF KNEE: ICD-10-CM

## 2023-06-28 DIAGNOSIS — Z29.9 ENCOUNTER FOR PROPHYLACTIC MEASURES, UNSPECIFIED: ICD-10-CM

## 2023-06-28 DIAGNOSIS — Z01.818 ENCOUNTER FOR OTHER PREPROCEDURAL EXAMINATION: ICD-10-CM

## 2023-06-28 LAB
A1C WITH ESTIMATED AVERAGE GLUCOSE RESULT: 5.2 % — SIGNIFICANT CHANGE UP (ref 4–5.6)
ALBUMIN SERPL ELPH-MCNC: 4.2 G/DL — SIGNIFICANT CHANGE UP (ref 3.3–5.2)
ALP SERPL-CCNC: 65 U/L — SIGNIFICANT CHANGE UP (ref 40–120)
ALT FLD-CCNC: 10 U/L — SIGNIFICANT CHANGE UP
ANION GAP SERPL CALC-SCNC: 9 MMOL/L — SIGNIFICANT CHANGE UP (ref 5–17)
APTT BLD: 31.6 SEC — SIGNIFICANT CHANGE UP (ref 27.5–35.5)
AST SERPL-CCNC: 19 U/L — SIGNIFICANT CHANGE UP
BASOPHILS # BLD AUTO: 0.02 K/UL — SIGNIFICANT CHANGE UP (ref 0–0.2)
BASOPHILS NFR BLD AUTO: 0.4 % — SIGNIFICANT CHANGE UP (ref 0–2)
BILIRUB SERPL-MCNC: 0.4 MG/DL — SIGNIFICANT CHANGE UP (ref 0.4–2)
BLD GP AB SCN SERPL QL: SIGNIFICANT CHANGE UP
BUN SERPL-MCNC: 18 MG/DL — SIGNIFICANT CHANGE UP (ref 8–20)
CALCIUM SERPL-MCNC: 9.8 MG/DL — SIGNIFICANT CHANGE UP (ref 8.4–10.5)
CHLORIDE SERPL-SCNC: 101 MMOL/L — SIGNIFICANT CHANGE UP (ref 96–108)
CO2 SERPL-SCNC: 28 MMOL/L — SIGNIFICANT CHANGE UP (ref 22–29)
CREAT SERPL-MCNC: 1.03 MG/DL — SIGNIFICANT CHANGE UP (ref 0.5–1.3)
EGFR: 60 ML/MIN/1.73M2 — SIGNIFICANT CHANGE UP
EOSINOPHIL # BLD AUTO: 0.17 K/UL — SIGNIFICANT CHANGE UP (ref 0–0.5)
EOSINOPHIL NFR BLD AUTO: 3.2 % — SIGNIFICANT CHANGE UP (ref 0–6)
ESTIMATED AVERAGE GLUCOSE: 103 MG/DL — SIGNIFICANT CHANGE UP (ref 68–114)
GLUCOSE SERPL-MCNC: 104 MG/DL — HIGH (ref 70–99)
HCT VFR BLD CALC: 34 % — LOW (ref 34.5–45)
HGB BLD-MCNC: 11.4 G/DL — LOW (ref 11.5–15.5)
IMM GRANULOCYTES NFR BLD AUTO: 0.4 % — SIGNIFICANT CHANGE UP (ref 0–0.9)
INR BLD: 1.11 RATIO — SIGNIFICANT CHANGE UP (ref 0.88–1.16)
LYMPHOCYTES # BLD AUTO: 1.41 K/UL — SIGNIFICANT CHANGE UP (ref 1–3.3)
LYMPHOCYTES # BLD AUTO: 26.4 % — SIGNIFICANT CHANGE UP (ref 13–44)
MCHC RBC-ENTMCNC: 29.8 PG — SIGNIFICANT CHANGE UP (ref 27–34)
MCHC RBC-ENTMCNC: 33.5 GM/DL — SIGNIFICANT CHANGE UP (ref 32–36)
MCV RBC AUTO: 88.8 FL — SIGNIFICANT CHANGE UP (ref 80–100)
MONOCYTES # BLD AUTO: 0.33 K/UL — SIGNIFICANT CHANGE UP (ref 0–0.9)
MONOCYTES NFR BLD AUTO: 6.2 % — SIGNIFICANT CHANGE UP (ref 2–14)
MRSA PCR RESULT.: SIGNIFICANT CHANGE UP
NEUTROPHILS # BLD AUTO: 3.39 K/UL — SIGNIFICANT CHANGE UP (ref 1.8–7.4)
NEUTROPHILS NFR BLD AUTO: 63.4 % — SIGNIFICANT CHANGE UP (ref 43–77)
PLATELET # BLD AUTO: 220 K/UL — SIGNIFICANT CHANGE UP (ref 150–400)
POTASSIUM SERPL-MCNC: 3.9 MMOL/L — SIGNIFICANT CHANGE UP (ref 3.5–5.3)
POTASSIUM SERPL-SCNC: 3.9 MMOL/L — SIGNIFICANT CHANGE UP (ref 3.5–5.3)
PROT SERPL-MCNC: 6.5 G/DL — LOW (ref 6.6–8.7)
PROTHROM AB SERPL-ACNC: 12.9 SEC — SIGNIFICANT CHANGE UP (ref 10.5–13.4)
RBC # BLD: 3.83 M/UL — SIGNIFICANT CHANGE UP (ref 3.8–5.2)
RBC # FLD: 14.4 % — SIGNIFICANT CHANGE UP (ref 10.3–14.5)
S AUREUS DNA NOSE QL NAA+PROBE: SIGNIFICANT CHANGE UP
SODIUM SERPL-SCNC: 138 MMOL/L — SIGNIFICANT CHANGE UP (ref 135–145)
WBC # BLD: 5.34 K/UL — SIGNIFICANT CHANGE UP (ref 3.8–10.5)
WBC # FLD AUTO: 5.34 K/UL — SIGNIFICANT CHANGE UP (ref 3.8–10.5)

## 2023-06-28 PROCEDURE — 93010 ELECTROCARDIOGRAM REPORT: CPT

## 2023-06-28 PROCEDURE — G0463: CPT

## 2023-06-28 PROCEDURE — 93005 ELECTROCARDIOGRAM TRACING: CPT

## 2023-06-28 RX ORDER — SODIUM CHLORIDE 9 MG/ML
3 INJECTION INTRAMUSCULAR; INTRAVENOUS; SUBCUTANEOUS EVERY 8 HOURS
Refills: 0 | Status: DISCONTINUED | OUTPATIENT
Start: 2023-07-18 | End: 2023-07-18

## 2023-06-28 RX ORDER — IBUPROFEN 200 MG
0 TABLET ORAL
Qty: 0 | Refills: 0 | DISCHARGE

## 2023-06-28 NOTE — H&P PST ADULT - NSICDXPASTMEDICALHX_GEN_ALL_CORE_FT
PAST MEDICAL HISTORY:  Bilateral primary osteoarthritis of knee     H/O: depression     Hypertension     Rheumatoid arthritis, adult     Varicose veins of right lower extremity with pain

## 2023-06-28 NOTE — H&P PST ADULT - MUSCULOSKELETAL
no calf tenderness/decreased ROM due to pain/strength 5/5 bilateral upper extremities/strength 5/5 bilateral lower extremities/abnormal gait details… b/l knees/no calf tenderness/decreased ROM due to pain/strength 5/5 bilateral upper extremities/strength 5/5 bilateral lower extremities/abnormal gait

## 2023-06-28 NOTE — H&P PST ADULT - HISTORY OF PRESENT ILLNESS
66 yo F PMH of RA (on methotrexate and Humira injections every 2 weeks), presents with c/o bilateral knee pain, left>right. She has Hx of advanced medial and patellofemoral osteoarthritis of both knees. She has been in PT which has been helping her a little. She states her pain has been worsening and conservative treatment such as HA injections and cortisone injections were unhelpful. She was scheduled for simultaneous bilateral TKA on 2023 which she cancelled. She is under pain management with Dr Curtis who has given her epidural injections. She fell on 2018 and landed on both knees. She sustained a right ACL tendon rupture which was treated nonoperatively by an orthopedic surgeon. She describes her pain as sharp, achy, throbbing, stabbing and shooting. She has more pain with stairs. She has been on disability since 2019. [facility] xray of the b/l knee done 2/15/2023 demonstrates bone-on-bone medial compartment osteoarthritis with varus deformity. Preop assessment prior to b// TKR w/Dr Grewal scheduled for 2023 66 yo F PMH of RA (on methotrexate and Humira injections every 2 weeks), presents with c/o bilateral knee pain, left>right. She has Hx of advanced medial and patellofemoral osteoarthritis of both knees. She has been in PT which has been helping her a little. She states her pain has been worsening and conservative treatment such as HA injections and cortisone injections were unhelpful. She was scheduled for simultaneous bilateral TKRs on 2023 which she cancelled. She is under pain management with Dr Curtis who has given her epidural injections. She fell on 2018 and landed on both knees. She sustained a right ACL tendon rupture which was treated nonoperatively by an orthopedic surgeon. She describes her pain as achy and burning. She has more pain with stairs. She has been on disability since 2019. [facility] xray of the b/l knee done 2/15/2023 demonstrates bone-on-bone medial compartment osteoarthritis with varus deformity. Preop assessment prior to b// TKR w/Dr Grewal scheduled for 2023

## 2023-06-28 NOTE — H&P PST ADULT - NSICDXPASTSURGICALHX_GEN_ALL_CORE_FT
PAST SURGICAL HISTORY:  H/O removal of cyst back    H/O varicose vein stripping     S/P Mohs surgery for basal cell carcinoma 2015 face and back

## 2023-06-28 NOTE — H&P PST ADULT - ASSESSMENT
68 yo F PMH of RA (on methotrexate and Humira injections every 2 weeks), presents with c/o bilateral knee pain, left>right. She has Hx of advanced medial and patellofemoral osteoarthritis of both knees. She has been in PT which has been helping her a little. She states her pain has been worsening and conservative treatment such as HA injections and cortisone injections were unhelpful. She was scheduled for simultaneous bilateral TKA on 2023 which she cancelled. She is under pain management with Dr Curtis who has given her epidural injections. She fell on 2018 and landed on both knees. She sustained a right ACL tendon rupture which was treated nonoperatively by an orthopedic surgeon. She describes her pain as sharp, achy, throbbing, stabbing and shooting. She has more pain with stairs. She has been on disability since 2019. Imagin Views xray of the b/l knee done 2/15/2023 demonstrates bone-on-bone medial compartment osteoarthritis with varus deformity. Preop assessment prior to b// TKR w/Dr Grewal scheduled for 2023    Patient was given information on joint class, joint book provided, ERP protocol reviewed with patient, MSSA/MRSA swabbed in PST, results pending     Patient was educated on preop preparation with written and verbal instructions. Pt was informed to obtain clearances >3 days before surgery. Pt will review medications with PCP. Pt was educated on NSAIDs, multivitamins and herbals that increase the risk of bleeding and need to be stopped 7 days before procedure. Pt verbalized understanding of the above.     OPIOID RISK TOOL    DANIEL EACH BOX THAT APPLIES AND ADD TOTALS AT THE END    FAMILY HISTORY OF SUBSTANCE ABUSE                 FEMALE         MALE                                                Alcohol                             [  ]1 pt          [  ]3pts                                               Illegal Durgs                     [  ]2 pts        [  ]3pts                                               Rx Drugs                           [  ]4 pts        [  ]4 pts    PERSONAL HISTORY OF SUBSTANCE ABUSE                                                                                          Alcohol                             [  ]3 pts       [  ]3 pts                                               Illegal Drugs                     [  ]4 pts        [  ]4 pts                                               Rx Drugs                           [  ]5 pts        [  ]5 pts    AGE BETWEEN 16-45 YEARS                                      [  ]1 pt         [  ]1 pt    HISTORY OF PREADOLESCENT   SEXUAL ABUSE                                                             [  ]3 pts        [  ]0pts    PSYCHOLOGICAL DISEASE                     ADD, OCD, Bipolar, Schizophrenia        [  ]2 pts         [  ]2 pts                      Depression                                               [ x ]1 pt           [  ]1 pt           SCORING TOTAL   (add numbers and type here)              ( 1 )                                     A score of 3 or lower indicated LOW risk for future opioid abuse  A score of 4 to 7 indicated moderate risk for future opioid abuse  A score of 8 or higher indicates a high risk for opioid abuse    CAPRINI VTE 2.0 SCORE [CLOT updated 2019]    AGE RELATED RISK FACTORS                                                       MOBILITY RELATED FACTORS  [ ] Age 41-60 years                                            (1 Point)                    [ ] Bed rest                                                        (1 Point)  [x ] Age: 61-74 years                                           (2 Points)                  [ ] Plaster cast                                                   (2 Points)  [ ] Age= 75 years                                              (3 Points)                    [ ] Bed bound for more than 72 hours                 (2 Points)    DISEASE RELATED RISK FACTORS                                               GENDER SPECIFIC FACTORS  [ ] Edema in the lower extremities                       (1 Point)              [ ] Pregnancy                                                     (1 Point)  [x ] Varicose veins                                               (1 Point)                     [ ] Post-partum < 6 weeks                                   (1 Point)             [x ] BMI > 25 Kg/m2                                            (1 Point)                     [ ] Hormonal therapy  or oral contraception          (1 Point)                 [ ] Sepsis (in the previous month)                        (1 Point)               [ ] History of pregnancy complications                 (1 point)  [ ] Pneumonia or serious lung disease                                               [ ] Unexplained or recurrent                     (1 Point)           (in the previous month)                               (1 Point)  [ ] Abnormal pulmonary function test                     (1 Point)                 SURGERY RELATED RISK FACTORS  [ ] Acute myocardial infarction                              (1 Point)               [ ]  Section                                             (1 Point)  [ ] Congestive heart failure (in the previous month)  (1 Point)      [ ] Minor surgery                                                  (1 Point)   [ ] Inflammatory bowel disease                             (1 Point)               [ ] Arthroscopic surgery                                        (2 Points)  [ ] Central venous access                                      (2 Points)                [ ] General surgery lasting more than 45 minutes (2 points)  [ ] Malignancy- Present or previous                   (2 Points)                [ x] Elective arthroplasty                                         (5 points)    [ ] Stroke (in the previous month)                          (5 Points)                                                                                                                                                           HEMATOLOGY RELATED FACTORS                                                 TRAUMA RELATED RISK FACTORS  [ ] Prior episodes of VTE                                     (3 Points)                [ ] Fracture of the hip, pelvis, or leg                       (5 Points)  [ ] Positive family history for VTE                         (3 Points)             [ ] Acute spinal cord injury (in the previous month)  (5 Points)  [ ] Prothrombin 40083 A                                     (3 Points)               [ ] Paralysis  (less than 1 month)                             (5 Points)  [ ] Factor V Leiden                                             (3 Points)                  [ ] Multiple Trauma within 1 month                        (5 Points)  [ ] Lupus anticoagulants                                     (3 Points)                                                           [ ] Anticardiolipin antibodies                               (3 Points)                                                       [ ] High homocysteine in the blood                      (3 Points)                                             [ ] Other congenital or acquired thrombophilia      (3 Points)                                                [ ] Heparin induced thrombocytopenia                  (3 Points)                                     Total Score [     9     ] 66 yo F PMH of RA (on methotrexate and Humira injections every 2 weeks), presents with c/o bilateral knee pain, left>right. She has Hx of advanced medial and patellofemoral osteoarthritis of both knees. She has been in PT which has been helping her a little. She states her pain has been worsening and conservative treatment such as HA injections and cortisone injections were unhelpful. She was scheduled for simultaneous bilateral TKRs on 2023 which she cancelled. She is under pain management with Dr Curtis who has given her epidural injections. She fell on 2018 and landed on both knees. She sustained a right ACL tendon rupture which was treated nonoperatively by an orthopedic surgeon. She describes her pain as achy and burning. She has more pain with stairs. She has been on disability since 2019. Imagin Views xray of the b/l knee done 2/15/2023 demonstrates bone-on-bone medial compartment osteoarthritis with varus deformity. Preop assessment prior to b// TKR w/Dr Grewal scheduled for 2023    Patient was given information on joint class, joint book provided, ERP protocol reviewed with patient, MSSA/MRSA swabbed in PST, results pending     Patient was educated on preop preparation with written and verbal instructions. Pt was informed to obtain clearance >3 days before surgery. Pt was educated on NSAIDs, multivitamins and herbals that increase the risk of bleeding and need to be stopped 7 days before procedure. Pt verbalized understanding of the above.     OPIOID RISK TOOL    DANIEL EACH BOX THAT APPLIES AND ADD TOTALS AT THE END    FAMILY HISTORY OF SUBSTANCE ABUSE                 FEMALE         MALE                                                Alcohol                             [  ]1 pt          [  ]3pts                                               Illegal Drugs                     [  ]2 pts        [  ]3pts                                               Rx Drugs                           [  ]4 pts        [  ]4 pts    PERSONAL HISTORY OF SUBSTANCE ABUSE                                                                                          Alcohol                             [  ]3 pts       [  ]3 pts                                               Illegal Drugs                     [  ]4 pts        [  ]4 pts                                               Rx Drugs                           [  ]5 pts        [  ]5 pts    AGE BETWEEN 16-45 YEARS                                      [  ]1 pt         [  ]1 pt    HISTORY OF PREADOLESCENT   SEXUAL ABUSE                                                             [  ]3 pts        [  ]0pts    PSYCHOLOGICAL DISEASE                     ADD, OCD, Bipolar, Schizophrenia        [  ]2 pts         [  ]2 pts                      Depression                                               [ x ]1 pt           [  ]1 pt           SCORING TOTAL   (add numbers and type here)              ( 1 )                                     A score of 3 or lower indicated LOW risk for future opioid abuse  A score of 4 to 7 indicated moderate risk for future opioid abuse  A score of 8 or higher indicates a high risk for opioid abuse    LEONELI VTE 2.0 SCORE [CLOT updated 2019]    AGE RELATED RISK FACTORS                                                       MOBILITY RELATED FACTORS  [ ] Age 41-60 years                                            (1 Point)                    [ ] Bed rest                                                        (1 Point)  [x ] Age: 61-74 years                                           (2 Points)                  [ ] Plaster cast                                                   (2 Points)  [ ] Age= 75 years                                              (3 Points)                    [ ] Bed bound for more than 72 hours                 (2 Points)    DISEASE RELATED RISK FACTORS                                               GENDER SPECIFIC FACTORS  [ ] Edema in the lower extremities                       (1 Point)              [ ] Pregnancy                                                     (1 Point)  [x ] Varicose veins                                               (1 Point)                     [ ] Post-partum < 6 weeks                                   (1 Point)             [x ] BMI > 25 Kg/m2                                            (1 Point)                     [ ] Hormonal therapy  or oral contraception          (1 Point)                 [ ] Sepsis (in the previous month)                        (1 Point)               [ ] History of pregnancy complications                 (1 point)  [ ] Pneumonia or serious lung disease                                               [ ] Unexplained or recurrent                     (1 Point)           (in the previous month)                               (1 Point)  [ ] Abnormal pulmonary function test                     (1 Point)                 SURGERY RELATED RISK FACTORS  [ ] Acute myocardial infarction                              (1 Point)               [ ]  Section                                             (1 Point)  [ ] Congestive heart failure (in the previous month)  (1 Point)      [ ] Minor surgery                                                  (1 Point)   [ ] Inflammatory bowel disease                             (1 Point)               [ ] Arthroscopic surgery                                        (2 Points)  [ ] Central venous access                                      (2 Points)                [ ] General surgery lasting more than 45 minutes (2 points)  [ ] Malignancy- Present or previous                   (2 Points)                [ x] Elective arthroplasty                                         (5 points)    [ ] Stroke (in the previous month)                          (5 Points)                                                                                                                                                           HEMATOLOGY RELATED FACTORS                                                 TRAUMA RELATED RISK FACTORS  [ ] Prior episodes of VTE                                     (3 Points)                [ ] Fracture of the hip, pelvis, or leg                       (5 Points)  [ ] Positive family history for VTE                         (3 Points)             [ ] Acute spinal cord injury (in the previous month)  (5 Points)  [ ] Prothrombin 50450 A                                     (3 Points)               [ ] Paralysis  (less than 1 month)                             (5 Points)  [ ] Factor V Leiden                                             (3 Points)                  [ ] Multiple Trauma within 1 month                        (5 Points)  [ ] Lupus anticoagulants                                     (3 Points)                                                           [ ] Anticardiolipin antibodies                               (3 Points)                                                       [ ] High homocysteine in the blood                      (3 Points)                                             [ ] Other congenital or acquired thrombophilia      (3 Points)                                                [ ] Heparin induced thrombocytopenia                  (3 Points)                                     Total Score [     9     ]

## 2023-06-28 NOTE — H&P PST ADULT - HEMATOLOGY/LYMPHATICS
Date last seen: 7/14/22  Date of next visit: none scheduled    Medication Requested:     Outpatient Current Medications as of as of 9/26/2022       Disp Refills Start End    drospirenone-ethinyl estradiol (GALLITO) 3-0.03 MG per tablet 28 tablet 0 6/17/2022     Sig - Route: Take 1 tablet by mouth daily. - Oral    Class: Eprescribe    Notes to Pharmacy: Needs appt for further refills.       BP Readings from Last 1 Encounters:   07/14/22 110/74     Rx approved and sent electronically to her pharmacy.   negative

## 2023-07-12 RX ORDER — TRANEXAMIC ACID 100 MG/ML
1000 INJECTION, SOLUTION INTRAVENOUS ONCE
Refills: 0 | Status: DISCONTINUED | OUTPATIENT
Start: 2023-07-18 | End: 2023-07-18

## 2023-07-17 ENCOUNTER — TRANSCRIPTION ENCOUNTER (OUTPATIENT)
Age: 68
End: 2023-07-17

## 2023-07-17 ENCOUNTER — RX RENEWAL (OUTPATIENT)
Age: 68
End: 2023-07-17

## 2023-07-18 ENCOUNTER — INPATIENT (INPATIENT)
Facility: HOSPITAL | Age: 68
LOS: 2 days | Discharge: HOME | DRG: 462 | End: 2023-07-21
Attending: ORTHOPAEDIC SURGERY | Admitting: ORTHOPAEDIC SURGERY
Payer: COMMERCIAL

## 2023-07-18 ENCOUNTER — TRANSCRIPTION ENCOUNTER (OUTPATIENT)
Age: 68
End: 2023-07-18

## 2023-07-18 ENCOUNTER — APPOINTMENT (OUTPATIENT)
Dept: ORTHOPEDIC SURGERY | Facility: HOSPITAL | Age: 68
End: 2023-07-18

## 2023-07-18 VITALS
RESPIRATION RATE: 16 BRPM | SYSTOLIC BLOOD PRESSURE: 129 MMHG | TEMPERATURE: 98 F | HEART RATE: 63 BPM | DIASTOLIC BLOOD PRESSURE: 47 MMHG | WEIGHT: 207.23 LBS | OXYGEN SATURATION: 100 % | HEIGHT: 68 IN

## 2023-07-18 DIAGNOSIS — M17.0 BILATERAL PRIMARY OSTEOARTHRITIS OF KNEE: ICD-10-CM

## 2023-07-18 DIAGNOSIS — Z98.890 OTHER SPECIFIED POSTPROCEDURAL STATES: Chronic | ICD-10-CM

## 2023-07-18 LAB — BLD GP AB SCN SERPL QL: SIGNIFICANT CHANGE UP

## 2023-07-18 PROCEDURE — 73560 X-RAY EXAM OF KNEE 1 OR 2: CPT | Mod: 26,50

## 2023-07-18 PROCEDURE — 27447 TOTAL KNEE ARTHROPLASTY: CPT | Mod: 50

## 2023-07-18 PROCEDURE — 20985 CPTR-ASST DIR MS PX: CPT

## 2023-07-18 PROCEDURE — 27447 TOTAL KNEE ARTHROPLASTY: CPT | Mod: AS,50

## 2023-07-18 DEVICE — CEMENT PALACOS R: Type: IMPLANTABLE DEVICE | Status: FUNCTIONAL

## 2023-07-18 DEVICE — ZIMMER FEMALE HEX SCREW MAGNETIC 2.5MM X 25MM: Type: IMPLANTABLE DEVICE | Status: FUNCTIONAL

## 2023-07-18 DEVICE — ZIMMER/NEXGEN SMOOTH PIN 3.2X75MM: Type: IMPLANTABLE DEVICE | Status: FUNCTIONAL

## 2023-07-18 DEVICE — IMPLANTABLE DEVICE: Type: IMPLANTABLE DEVICE | Status: FUNCTIONAL

## 2023-07-18 DEVICE — ZIMMER/NEXGEN HEX HEAD SCREW 3.5MM: Type: IMPLANTABLE DEVICE | Status: FUNCTIONAL

## 2023-07-18 DEVICE — PATELLA  ALL POLY VE 32MM: Type: IMPLANTABLE DEVICE | Status: FUNCTIONAL

## 2023-07-18 DEVICE — STEM TIB PSN 5 DEG SZ F R: Type: IMPLANTABLE DEVICE | Status: FUNCTIONAL

## 2023-07-18 DEVICE — ORTHOALIGN THREADED PIN HEADED: Type: IMPLANTABLE DEVICE | Status: FUNCTIONAL

## 2023-07-18 DEVICE — STEM EXT PERSONA 14MM PLUS 30M: Type: IMPLANTABLE DEVICE | Status: FUNCTIONAL

## 2023-07-18 DEVICE — STEM TIB PSN 5 DEG SZF L: Type: IMPLANTABLE DEVICE | Status: FUNCTIONAL

## 2023-07-18 RX ORDER — ESCITALOPRAM OXALATE 10 MG/1
10 TABLET, FILM COATED ORAL DAILY
Refills: 0 | Status: DISCONTINUED | OUTPATIENT
Start: 2023-07-18 | End: 2023-07-21

## 2023-07-18 RX ORDER — METHOTREXATE 2.5 MG/1
0 TABLET ORAL
Qty: 0 | Refills: 2 | DISCHARGE

## 2023-07-18 RX ORDER — BUPIVACAINE 13.3 MG/ML
40 INJECTION, SUSPENSION, LIPOSOMAL INFILTRATION ONCE
Refills: 0 | Status: DISCONTINUED | OUTPATIENT
Start: 2023-07-18 | End: 2023-07-18

## 2023-07-18 RX ORDER — SODIUM CHLORIDE 9 MG/ML
1000 INJECTION INTRAMUSCULAR; INTRAVENOUS; SUBCUTANEOUS
Refills: 0 | Status: DISCONTINUED | OUTPATIENT
Start: 2023-07-18 | End: 2023-07-21

## 2023-07-18 RX ORDER — CEFAZOLIN SODIUM 1 G
2000 VIAL (EA) INJECTION ONCE
Refills: 0 | Status: DISCONTINUED | OUTPATIENT
Start: 2023-07-18 | End: 2023-07-18

## 2023-07-18 RX ORDER — SODIUM CHLORIDE 9 MG/ML
1000 INJECTION, SOLUTION INTRAVENOUS
Refills: 0 | Status: DISCONTINUED | OUTPATIENT
Start: 2023-07-18 | End: 2023-07-18

## 2023-07-18 RX ORDER — OXYCODONE HYDROCHLORIDE 5 MG/1
10 TABLET ORAL
Refills: 0 | Status: DISCONTINUED | OUTPATIENT
Start: 2023-07-18 | End: 2023-07-21

## 2023-07-18 RX ORDER — ACETAMINOPHEN 500 MG
1000 TABLET ORAL ONCE
Refills: 0 | Status: COMPLETED | OUTPATIENT
Start: 2023-07-18 | End: 2023-07-18

## 2023-07-18 RX ORDER — POLYETHYLENE GLYCOL 3350 17 G/17G
17 POWDER, FOR SOLUTION ORAL AT BEDTIME
Refills: 0 | Status: DISCONTINUED | OUTPATIENT
Start: 2023-07-18 | End: 2023-07-21

## 2023-07-18 RX ORDER — ONDANSETRON 8 MG/1
4 TABLET, FILM COATED ORAL ONCE
Refills: 0 | Status: DISCONTINUED | OUTPATIENT
Start: 2023-07-18 | End: 2023-07-18

## 2023-07-18 RX ORDER — ONDANSETRON 8 MG/1
4 TABLET, FILM COATED ORAL EVERY 6 HOURS
Refills: 0 | Status: DISCONTINUED | OUTPATIENT
Start: 2023-07-18 | End: 2023-07-21

## 2023-07-18 RX ORDER — PANTOPRAZOLE SODIUM 20 MG/1
40 TABLET, DELAYED RELEASE ORAL
Refills: 0 | Status: DISCONTINUED | OUTPATIENT
Start: 2023-07-18 | End: 2023-07-21

## 2023-07-18 RX ORDER — SODIUM CHLORIDE 9 MG/ML
500 INJECTION INTRAMUSCULAR; INTRAVENOUS; SUBCUTANEOUS ONCE
Refills: 0 | Status: COMPLETED | OUTPATIENT
Start: 2023-07-18 | End: 2023-07-18

## 2023-07-18 RX ORDER — APREPITANT 80 MG/1
40 CAPSULE ORAL ONCE
Refills: 0 | Status: COMPLETED | OUTPATIENT
Start: 2023-07-18 | End: 2023-07-18

## 2023-07-18 RX ORDER — CEFAZOLIN SODIUM 1 G
2000 VIAL (EA) INJECTION
Refills: 0 | Status: COMPLETED | OUTPATIENT
Start: 2023-07-18 | End: 2023-07-19

## 2023-07-18 RX ORDER — ZOLPIDEM TARTRATE 10 MG/1
5 TABLET ORAL AT BEDTIME
Refills: 0 | Status: DISCONTINUED | OUTPATIENT
Start: 2023-07-18 | End: 2023-07-21

## 2023-07-18 RX ORDER — ACETAMINOPHEN 500 MG
975 TABLET ORAL ONCE
Refills: 0 | Status: COMPLETED | OUTPATIENT
Start: 2023-07-18 | End: 2023-07-18

## 2023-07-18 RX ORDER — HYDROMORPHONE HYDROCHLORIDE 2 MG/ML
4 INJECTION INTRAMUSCULAR; INTRAVENOUS; SUBCUTANEOUS
Refills: 0 | Status: DISCONTINUED | OUTPATIENT
Start: 2023-07-18 | End: 2023-07-21

## 2023-07-18 RX ORDER — MAGNESIUM HYDROXIDE 400 MG/1
30 TABLET, CHEWABLE ORAL DAILY
Refills: 0 | Status: DISCONTINUED | OUTPATIENT
Start: 2023-07-18 | End: 2023-07-21

## 2023-07-18 RX ORDER — FOLIC ACID 0.8 MG
0 TABLET ORAL
Qty: 0 | Refills: 0 | DISCHARGE

## 2023-07-18 RX ORDER — CELECOXIB 200 MG/1
200 CAPSULE ORAL EVERY 12 HOURS
Refills: 0 | Status: DISCONTINUED | OUTPATIENT
Start: 2023-07-20 | End: 2023-07-21

## 2023-07-18 RX ORDER — CEFAZOLIN SODIUM 1 G
2000 VIAL (EA) INJECTION EVERY 8 HOURS
Refills: 0 | Status: DISCONTINUED | OUTPATIENT
Start: 2023-07-18 | End: 2023-07-18

## 2023-07-18 RX ORDER — OXYCODONE HYDROCHLORIDE 5 MG/1
5 TABLET ORAL
Refills: 0 | Status: DISCONTINUED | OUTPATIENT
Start: 2023-07-18 | End: 2023-07-21

## 2023-07-18 RX ORDER — ASPIRIN/CALCIUM CARB/MAGNESIUM 324 MG
81 TABLET ORAL
Refills: 0 | Status: DISCONTINUED | OUTPATIENT
Start: 2023-07-18 | End: 2023-07-20

## 2023-07-18 RX ORDER — SENNA PLUS 8.6 MG/1
2 TABLET ORAL AT BEDTIME
Refills: 0 | Status: DISCONTINUED | OUTPATIENT
Start: 2023-07-18 | End: 2023-07-21

## 2023-07-18 RX ORDER — FENTANYL CITRATE 50 UG/ML
25 INJECTION INTRAVENOUS
Refills: 0 | Status: DISCONTINUED | OUTPATIENT
Start: 2023-07-18 | End: 2023-07-18

## 2023-07-18 RX ORDER — ACETAMINOPHEN 500 MG
975 TABLET ORAL EVERY 8 HOURS
Refills: 0 | Status: DISCONTINUED | OUTPATIENT
Start: 2023-07-18 | End: 2023-07-21

## 2023-07-18 RX ORDER — FENTANYL CITRATE 50 UG/ML
50 INJECTION INTRAVENOUS
Refills: 0 | Status: DISCONTINUED | OUTPATIENT
Start: 2023-07-18 | End: 2023-07-18

## 2023-07-18 RX ADMIN — SENNA PLUS 2 TABLET(S): 8.6 TABLET ORAL at 21:11

## 2023-07-18 RX ADMIN — SODIUM CHLORIDE 500 MILLILITER(S): 9 INJECTION INTRAMUSCULAR; INTRAVENOUS; SUBCUTANEOUS at 18:31

## 2023-07-18 RX ADMIN — OXYCODONE HYDROCHLORIDE 10 MILLIGRAM(S): 5 TABLET ORAL at 19:51

## 2023-07-18 RX ADMIN — Medication 2000 MILLIGRAM(S): at 23:29

## 2023-07-18 RX ADMIN — Medication 975 MILLIGRAM(S): at 12:08

## 2023-07-18 RX ADMIN — FENTANYL CITRATE 50 MICROGRAM(S): 50 INJECTION INTRAVENOUS at 19:15

## 2023-07-18 RX ADMIN — OXYCODONE HYDROCHLORIDE 10 MILLIGRAM(S): 5 TABLET ORAL at 23:29

## 2023-07-18 RX ADMIN — Medication 81 MILLIGRAM(S): at 21:11

## 2023-07-18 RX ADMIN — FENTANYL CITRATE 50 MICROGRAM(S): 50 INJECTION INTRAVENOUS at 19:09

## 2023-07-18 RX ADMIN — Medication 975 MILLIGRAM(S): at 21:11

## 2023-07-18 RX ADMIN — APREPITANT 40 MILLIGRAM(S): 80 CAPSULE ORAL at 12:08

## 2023-07-18 RX ADMIN — Medication 975 MILLIGRAM(S): at 22:11

## 2023-07-18 NOTE — DISCHARGE NOTE PROVIDER - NSDCCPCAREPLAN_GEN_ALL_CORE_FT
PRINCIPAL DISCHARGE DIAGNOSIS  Diagnosis: Bilateral primary osteoarthritis of knee  Assessment and Plan of Treatment:

## 2023-07-18 NOTE — BRIEF OPERATIVE NOTE - NSICDXBRIEFPOSTOP_GEN_ALL_CORE_FT
POST-OP DIAGNOSIS:  Localized osteoarthritis of knees, bilateral 18-Jul-2023 17:25:15  Jose Alfredo Grewal

## 2023-07-18 NOTE — DISCHARGE NOTE PROVIDER - NSDCMRMEDTOKEN_GEN_ALL_CORE_FT
ESCITALOPRAM 10 MG TABLET:   FOLIC ACID  1 MG TABS:   HYDROCHLOROTHIAZIDE  12.5 MG TABS:   METHOTREXATE 50 MG/2 ML VIAL: 0.8ml q weekly on Friday   ZOLPIDEM 10MG       TAB:    acetaminophen 325 mg oral tablet: 3 tab(s) orally every 8 hours  Aspirin Enteric Coated 81 mg oral delayed release tablet: 1 tab(s) orally 2 times a day  celecoxib 200 mg oral capsule: 1 cap(s) orally every 12 hours  ESCITALOPRAM 10 MG TABLET:   FOLIC ACID  1 MG TABS:   HYDROCHLOROTHIAZIDE  12.5 MG TABS:   METHOTREXATE 50 MG/2 ML VIAL: 0.8ml q weekly on Friday   omeprazole 20 mg oral delayed release capsule: 1 cap(s) orally once a day  oxyCODONE 5 mg oral tablet: 1 tab(s) orally every 6 hours as needed for  mild pain MDD: 4  Senna S 50 mg-8.6 mg oral tablet: 2 tab(s) orally once a day (at bedtime)  ZOLPIDEM 10MG       TAB:    acetaminophen 325 mg oral tablet: 3 tab(s) orally every 8 hours  apixaban 2.5 mg oral tablet: 1 tab(s) orally every 12 hours Continue x 14 days then, once completed,  start ASPIRIN 81 bid x 4 weeks.  Aspirin Enteric Coated 81 mg oral delayed release tablet: 1 tab(s) orally 2 times a day  celecoxib 200 mg oral capsule: 1 cap(s) orally every 12 hours  ESCITALOPRAM 10 MG TABLET:   FOLIC ACID  1 MG TABS:   HYDROCHLOROTHIAZIDE  12.5 MG TABS:   METHOTREXATE 50 MG/2 ML VIAL: 0.8ml q weekly on Friday   omeprazole 20 mg oral delayed release capsule: 1 cap(s) orally once a day  oxyCODONE 5 mg oral tablet: 1 tab(s) orally every 6 hours as needed for  mild pain MDD: 4  Senna S 50 mg-8.6 mg oral tablet: 2 tab(s) orally once a day (at bedtime)  ZOLPIDEM 10MG       TAB:

## 2023-07-18 NOTE — DISCHARGE NOTE PROVIDER - HOSPITAL COURSE
The patient underwent a RIGHT and LEFT TOTAL KNEE REPLACEMENT on 7/18/23. The patient received antibiotics consistent with SCIP guidelines. The patient underwent the procedure and had no intra-operative complications. Post-operatively, the patient was seen by medicine and PT. The patient received ASPIRIN for DVTP. The patient received pain medications per orthopedic pain management pathway and the pain was appropriately controlled. The patient did not have any post-operative medical complications. The patient was discharged in stable condition. The patient underwent a BILATERAL TOTAL KNEE REPLACEMENT on 7/18/23. The patient received antibiotics consistent with SCIP guidelines. The patient underwent the procedure and had no intra-operative complications. Post-operatively, the patient was seen by medicine and PT. The patient received ASPIRIN for DVTP. The patient received pain medications per orthopedic pain management pathway and the pain was appropriately controlled. The patient did not have any post-operative medical complications. The patient was discharged in stable condition. The patient underwent a BILATERAL TOTAL KNEE REPLACEMENT on 7/18/23. The patient received antibiotics consistent with SCIP guidelines. The patient underwent the procedure and had no intra-operative complications. Post-operatively, the patient was seen by medicine and PT. The patient received ELIQUIS for DVTP. The patient received pain medications per orthopedic pain management pathway and the pain was appropriately controlled. The patient did not have any post-operative medical complications. The patient was discharged in stable condition.

## 2023-07-18 NOTE — DISCHARGE NOTE PROVIDER - CARE PROVIDER_API CALL
Jose Alfredo Grewal  Orthopaedic Surgery  [address] [place] [id number]  Phone: [phone]  Fax: [phone]  Follow Up Time:

## 2023-07-18 NOTE — ASU PREOP CHECKLIST - ASSESSMENT, HISTORY & PHYSICAL COMPLETED AND ON MEDICAL RECORD
How Severe Are Your Spot(S)?: mild
What Type Of Note Output Would You Prefer (Optional)?: Standard Output
What Is The Reason For Today's Visit?: Full Body Skin Examination
What Is The Reason For Today's Visit? (Being Monitored For X): concerning skin lesions on an annual basis
done

## 2023-07-18 NOTE — BRIEF OPERATIVE NOTE - NSICDXBRIEFPREOP_GEN_ALL_CORE_FT
PRE-OP DIAGNOSIS:  Localized osteoarthritis of knees, bilateral 18-Jul-2023 17:25:03  Jose Alfredo Grewal

## 2023-07-18 NOTE — PHYSICAL THERAPY INITIAL EVALUATION ADULT - ADDITIONAL COMMENTS
Pt reports living in private home with her son who is able to assist prn. Pt has 1 step to enter, 6 stairs inside up/down (split) with handrail. Independent prior to admission. Owns all DME.

## 2023-07-18 NOTE — PHYSICAL THERAPY INITIAL EVALUATION ADULT - PHYSICAL ASSIST/NONPHYSICAL ASSIST: SIT/STAND, REHAB EVAL
Pt told family that she had difficulty urinating, Dr. Strickland made aware verbal cues/nonverbal cues (demo/gestures)/1 person assist

## 2023-07-18 NOTE — PHYSICAL THERAPY INITIAL EVALUATION ADULT - GENERAL OBSERVATIONS, REHAB EVAL
Pt received in stretcher + IV + tele//BP + VCBs, breathing on RA in NAD, in 4/10 b/l knee pain, agreeable to PT eval

## 2023-07-18 NOTE — DISCHARGE NOTE PROVIDER - NSDCFUADDINST_GEN_ALL_CORE_FT
The patient will be seen in the office between 2-3 weeks for wound check.   **Your first post-operative visit has been scheduled prior to your admission. PLEASE CONTACT OFFICE TO CONFIRM THE APPOINTMENT DATE. Tape will be removed at that time.  **  The silver based dressing is to be removed 7 days from the date of surgery.   ** CONTACT THE OFFICE IF THE FOLLOWING DEVELOP:  - the dressing becomes soiled or saturated  - you develop a fever greater that 101F  - the wound becomes red or you develop blistering around the wound  * Patient may shower after post-op day #3.   * The patient will continue home PT consistent with  total knee replacement protocol. Transition to outpatient PT will occur at the time of the first office visit.   * The patient will practice knee extension exercises regularly to minimize hamstring contraction.   * The patient is FULL weight bearing.  * The patient will continue ASPIRIN 81mg twice daily for 6 weeks after surgery for blood clot prevention.  *** While on aspirin, the patient will take daily omeprazole or other similar medication to protect the stomach from irritation.   * The patient will take OXYCODONE AND TYLENOL for pain control and adjust according to prescription and patient needs. Contact the office if pain increases while taking prescribed pain medications or related concerns develop.  * Celebrex will be taken twice daily for 3 weeks for pain control and prevention of excessive bone growth. Additional prescription may be requested at your office follow-up visit.   * The patient will take Senna S while taking oxycodone to prevent narcotic associated constipation.  Additionally, increase water intake (drink at least 8 glasses of water daily) and try adding fiber to the diet by eating fruits, vegetables and foods that are rich in grains. If constipation is experienced, contact the medical/primary care provider to discuss further treatment options.  * To avoid injury at home:  - continue use of rolling walker until cleared by physical therapist  - have family or friend remove all throw rug or objects in hallways that may present a trip hazard.  - if you experience any dizziness or medical concerns, call your medical doctor or  911.  * The implant may activate metal detection devices.  The patient will be seen in the office between 2-3 weeks for wound check.   **Your first post-operative visit has been scheduled prior to your admission. PLEASE CONTACT OFFICE TO CONFIRM THE APPOINTMENT DATE. Tape will be removed at that time.  **  The silver based dressing is to be removed 7 days from the date of surgery.   ** CONTACT THE OFFICE IF THE FOLLOWING DEVELOP:  - the dressing becomes soiled or saturated  - you develop a fever greater that 101F  - the wound becomes red or you develop blistering around the wound  * Patient may shower after post-op day #3.   * The patient will continue home PT consistent with  total knee replacement protocol. Transition to outpatient PT will occur at the time of the first office visit.   * The patient will practice knee extension exercises regularly to minimize hamstring contraction.   * The patient is FULL weight bearing.  * The patient will continue ELIQUIS 2.5mg twice daily for 2 weeks and then start ASPIRIN 81mg twice daily for 4 weeks after surgery for blood clot prevention.  *** While on aspirin, the patient will take daily omeprazole or other similar medication to protect the stomach from irritation.   * The patient will take OXYCODONE AND TYLENOL for pain control and adjust according to prescription and patient needs. Contact the office if pain increases while taking prescribed pain medications or related concerns develop.  * Celebrex will be taken twice daily for 3 weeks for pain control and prevention of excessive bone growth. Additional prescription may be requested at your office follow-up visit.   * The patient will take Senna S while taking oxycodone to prevent narcotic associated constipation.  Additionally, increase water intake (drink at least 8 glasses of water daily) and try adding fiber to the diet by eating fruits, vegetables and foods that are rich in grains. If constipation is experienced, contact the medical/primary care provider to discuss further treatment options.  * To avoid injury at home:  - continue use of rolling walker until cleared by physical therapist  - have family or friend remove all throw rug or objects in hallways that may present a trip hazard.  - if you experience any dizziness or medical concerns, call your medical doctor or  911.  * The implant may activate metal detection devices.

## 2023-07-18 NOTE — DISCHARGE NOTE PROVIDER - NSDCFUSCHEDAPPT_GEN_ALL_CORE_FT
Jose Alfredo Grewal  [facility]  ORTHOSURG [address]  Scheduled Appointment: 08/09/2023    Elebiary, Yeon J  [facility]  ORTHOSURG [address]  Scheduled Appointment: 09/05/2023    Nick Goodwin  [facility]  ON[address]TH[address] [facility]  Scheduled Appointment: 09/13/2023    Laila Crowe  [facility] [address] [facility]  Scheduled Appointment: 09/19/2023    Jose Alfredo Grewal  [facility]  ORTHOSURG [address]  Scheduled Appointment: 10/09/2023

## 2023-07-19 ENCOUNTER — TRANSCRIPTION ENCOUNTER (OUTPATIENT)
Age: 68
End: 2023-07-19

## 2023-07-19 PROCEDURE — 99222 1ST HOSP IP/OBS MODERATE 55: CPT

## 2023-07-19 RX ORDER — ACETAMINOPHEN 500 MG
3 TABLET ORAL
Qty: 0 | Refills: 0 | DISCHARGE
Start: 2023-07-19

## 2023-07-19 RX ORDER — ZOLPIDEM TARTRATE 10 MG/1
10 TABLET ORAL
Qty: 0 | Refills: 1 | DISCHARGE

## 2023-07-19 RX ORDER — ASPIRIN/CALCIUM CARB/MAGNESIUM 324 MG
1 TABLET ORAL
Qty: 84 | Refills: 0
Start: 2023-07-19 | End: 2023-08-29

## 2023-07-19 RX ORDER — ESCITALOPRAM OXALATE 10 MG/1
10 TABLET, FILM COATED ORAL
Qty: 0 | Refills: 0 | DISCHARGE

## 2023-07-19 RX ORDER — SENNOSIDES/DOCUSATE SODIUM 8.6MG-50MG
2 TABLET ORAL
Qty: 28 | Refills: 0
Start: 2023-07-19 | End: 2023-08-01

## 2023-07-19 RX ORDER — OXYCODONE HYDROCHLORIDE 5 MG/1
1 TABLET ORAL
Qty: 28 | Refills: 0
Start: 2023-07-19 | End: 2023-07-25

## 2023-07-19 RX ORDER — OMEPRAZOLE 10 MG/1
1 CAPSULE, DELAYED RELEASE ORAL
Qty: 42 | Refills: 0
Start: 2023-07-19 | End: 2023-08-29

## 2023-07-19 RX ADMIN — ESCITALOPRAM OXALATE 10 MILLIGRAM(S): 10 TABLET, FILM COATED ORAL at 11:32

## 2023-07-19 RX ADMIN — HYDROMORPHONE HYDROCHLORIDE 4 MILLIGRAM(S): 2 INJECTION INTRAMUSCULAR; INTRAVENOUS; SUBCUTANEOUS at 17:37

## 2023-07-19 RX ADMIN — PANTOPRAZOLE SODIUM 40 MILLIGRAM(S): 20 TABLET, DELAYED RELEASE ORAL at 05:57

## 2023-07-19 RX ADMIN — OXYCODONE HYDROCHLORIDE 10 MILLIGRAM(S): 5 TABLET ORAL at 03:36

## 2023-07-19 RX ADMIN — OXYCODONE HYDROCHLORIDE 10 MILLIGRAM(S): 5 TABLET ORAL at 09:50

## 2023-07-19 RX ADMIN — OXYCODONE HYDROCHLORIDE 10 MILLIGRAM(S): 5 TABLET ORAL at 10:50

## 2023-07-19 RX ADMIN — HYDROMORPHONE HYDROCHLORIDE 4 MILLIGRAM(S): 2 INJECTION INTRAMUSCULAR; INTRAVENOUS; SUBCUTANEOUS at 12:50

## 2023-07-19 RX ADMIN — OXYCODONE HYDROCHLORIDE 10 MILLIGRAM(S): 5 TABLET ORAL at 06:57

## 2023-07-19 RX ADMIN — OXYCODONE HYDROCHLORIDE 10 MILLIGRAM(S): 5 TABLET ORAL at 00:29

## 2023-07-19 RX ADMIN — Medication 975 MILLIGRAM(S): at 11:32

## 2023-07-19 RX ADMIN — Medication 81 MILLIGRAM(S): at 05:57

## 2023-07-19 RX ADMIN — Medication 975 MILLIGRAM(S): at 06:34

## 2023-07-19 RX ADMIN — Medication 975 MILLIGRAM(S): at 21:27

## 2023-07-19 RX ADMIN — OXYCODONE HYDROCHLORIDE 10 MILLIGRAM(S): 5 TABLET ORAL at 05:57

## 2023-07-19 RX ADMIN — HYDROMORPHONE HYDROCHLORIDE 4 MILLIGRAM(S): 2 INJECTION INTRAMUSCULAR; INTRAVENOUS; SUBCUTANEOUS at 21:28

## 2023-07-19 RX ADMIN — Medication 975 MILLIGRAM(S): at 12:32

## 2023-07-19 RX ADMIN — Medication 975 MILLIGRAM(S): at 22:27

## 2023-07-19 RX ADMIN — Medication 2000 MILLIGRAM(S): at 05:57

## 2023-07-19 RX ADMIN — Medication 81 MILLIGRAM(S): at 17:36

## 2023-07-19 RX ADMIN — Medication 975 MILLIGRAM(S): at 05:57

## 2023-07-19 RX ADMIN — HYDROMORPHONE HYDROCHLORIDE 4 MILLIGRAM(S): 2 INJECTION INTRAMUSCULAR; INTRAVENOUS; SUBCUTANEOUS at 22:28

## 2023-07-19 RX ADMIN — OXYCODONE HYDROCHLORIDE 10 MILLIGRAM(S): 5 TABLET ORAL at 02:36

## 2023-07-19 NOTE — PATIENT PROFILE ADULT - NSPRESCRALCFREQ_GEN_A_NUR
PN,  Gave pt cardiology number to schedule from 6/3/19 order from JS,  If anything else, please advise.  Thanks,  Diane Ortega RN     Never

## 2023-07-19 NOTE — CONSULT NOTE ADULT - SUBJECTIVE AND OBJECTIVE BOX
68 yo F Hx of RA (on methotrexate and Humira injections every 2 weeks), she has bilateral knee pain, left>right. She has Hx of advanced medial and patellofemoral osteoarthritis of both knees. She has been in PT which has been helping her a little, pain was worsening and conservative treatment such as HA injections and cortisone injections were unhelpful. She was scheduled for simultaneous bilateral TKRs on 2/7/2023 which she cancelled. She was following pain management with Dr Curtis who has given her epidural injections, she came in here for BV/L TKR w/Dr Grewal on 7/18/2023 s/p procedure, her pain is well controlled, denies fever, chills, chest pain, nausea, vomiting, sob, chest pain.     Allergies:  	Voltaren: Drug, Other, Pruritus, PO Voltaren elevate blood pressure  	Mobic: Drug, Other, Pruritus, elevates blood pressure  	Lyrica: Drug, Unknown    PAST MEDICAL HISTORY:  Bilateral primary osteoarthritis of knee     H/O: depression     Hypertension     Rheumatoid arthritis, adult     Varicose veins of right lower extremity with pain.     PAST SURGICAL HISTORY:  H/O removal of cyst back    H/O varicose vein stripping     S/P Mohs surgery for basal cell carcinoma 2015 face and back.     FAMILY HISTORY:  Father  Still living? No  FHx: heart disease, Age at diagnosis: Age Unknown    Mother  Still living? No  FHx: hypertension, Age at diagnosis: Age Unknown    Sibling  Still living? Yes, Estimated age: Age Unknown  FH: diabetes mellitus, Age at diagnosis: Age Unknown  FHx: hypertension, Age at diagnosis: Age Unknown.      Social History:   Not a smoker, drinker or using any drugs       Home Medications:   * Patient Currently Takes Medications as of 28-Jun-2023 10:02 documented in Structured Notes  · 	METHOTREXATE 50 MG/2 ML VIAL: Last Dose Taken:  , 0.8ml q weekly on Friday   · 	FOLIC ACID  1 MG TABS: Last Dose Taken:    · 	HYDROCHLOROTHIAZIDE  12.5 MG TABS: Last Dose Taken:    · 	ZOLPIDEM 10MG       TAB: Last Dose Taken:    · 	ESCITALOPRAM 10 MG TABLET: Last Dose Taken:        Vital Signs Last 24 Hrs  T(C): 36.4 (19 Jul 2023 05:38), Max: 36.6 (18 Jul 2023 19:30)  T(F): 97.5 (19 Jul 2023 05:38), Max: 97.9 (18 Jul 2023 19:30)  HR: 61 (19 Jul 2023 05:38) (52 - 69)  BP: 111/61 (19 Jul 2023 05:38) (104/62 - 137/52)  BP(mean): 80 (18 Jul 2023 20:20) (66 - 91)  RR: 18 (19 Jul 2023 05:38) (15 - 18)  SpO2: 93% (19 Jul 2023 05:38) (93% - 100%)    Parameters below as of 19 Jul 2023 05:38  Patient On (Oxygen Delivery Method): room air        PHYSICAL EXAM:    GENERAL: Middle age female looking comfortable   HEENT: PERRL, +EOMI  NECK: soft, Supple, No JVD  CHEST/LUNG: Clear to auscultate bilaterally; No wheezing  HEART: S1S2+, Regular rate and rhythm; No murmurs  ABDOMEN: Soft, Nontender, Nondistended; Bowel sounds present  EXTREMITIES:  1+ Peripheral Pulses, No edema, B/L Knees with clean dressings on  SKIN: No rashes or lesions  NEURO: AAOX3  PSYCH: normal mood      LABS:                  I&O's Summary    18 Jul 2023 07:01  -  19 Jul 2023 07:00  --------------------------------------------------------  IN: 2312 mL / OUT: 300 mL / NET: 2012 mL        MEDICATIONS  (STANDING):  acetaminophen     Tablet .. 975 milliGRAM(s) Oral every 8 hours  aspirin enteric coated 81 milliGRAM(s) Oral two times a day  escitalopram 10 milliGRAM(s) Oral daily  hydrochlorothiazide 12.5 milliGRAM(s) Oral daily  pantoprazole    Tablet 40 milliGRAM(s) Oral before breakfast  polyethylene glycol 3350 17 Gram(s) Oral at bedtime  senna 2 Tablet(s) Oral at bedtime  sodium chloride 0.9%. 1000 milliLiter(s) (125 mL/Hr) IV Continuous <Continuous>    MEDICATIONS  (PRN):  aluminum hydroxide/magnesium hydroxide/simethicone Suspension 30 milliLiter(s) Oral four times a day PRN Indigestion  HYDROmorphone   Tablet 4 milliGRAM(s) Oral every 3 hours PRN Severe Pain (7 - 10)  magnesium hydroxide Suspension 30 milliLiter(s) Oral daily PRN Constipation  ondansetron Injectable 4 milliGRAM(s) IV Push every 6 hours PRN Nausea and/or Vomiting  oxyCODONE    IR 5 milliGRAM(s) Oral every 3 hours PRN Mild Pain (1 - 3)  oxyCODONE    IR 10 milliGRAM(s) Oral every 3 hours PRN Moderate Pain (4 - 6)  zolpidem 5 milliGRAM(s) Oral at bedtime PRN Insomnia  zolpidem 5 milliGRAM(s) Oral at bedtime PRN Insomnia

## 2023-07-19 NOTE — CONSULT NOTE ADULT - ASSESSMENT
66 yo F Hx of RA (on methotrexate and Humira injections every 2 weeks), she has bilateral knee pain, left>right. She has Hx of advanced medial and patellofemoral osteoarthritis of both knees. She has been in PT which has been helping her a little, pain was worsening and conservative treatment such as HA injections and cortisone injections were unhelpful. She was scheduled for simultaneous bilateral TKRs on 2/7/2023 which she cancelled. She was following pain management with Dr Curtis who has given her epidural injections, she came in here for BV/L TKR w/Dr Grewal on 7/18/2023 s/p procedure.     Plan:     B/L Knee OA s/p TKRs post op day 01:     - post op no complications  - VS stable  - abx per ortho   - c/w IVF   - opiate induced constipation regimen   - encouraging incentive spirometry   -c/w local wound care per ortho   -DVT prophylaxis and Pain meds as per Ortho team   -PT/OT and weight bearing per ortho       RA: Will continue with METHOTREXATE 50 MG/2 ML q weekly on Friday, FOLIC ACID  1mg daily.     HTN: will resume HYDROCHLOROTHIAZIDE  12.5mg daily upon d/c     Insomnia: Will continue with ZOLPIDEM 10MG at night    Hx of Depression: Will continue with ESCITALOPRAM 10mg daily     Patient is stable from the medicine point of view for discharge pending PT and Ortho eval.

## 2023-07-19 NOTE — DISCHARGE NOTE NURSING/CASE MANAGEMENT/SOCIAL WORK - PATIENT PORTAL LINK FT
You can access the FollowMyHealth Patient Portal offered by [facility] by registering at the following website: [email or web address]. By joining [facility]’s FollowMyHealth portal, you will also be able to view your health information using other applications (apps) compatible with our system.

## 2023-07-19 NOTE — DISCHARGE NOTE NURSING/CASE MANAGEMENT/SOCIAL WORK - NSDCPEFALRISK_GEN_ALL_CORE
For information on Fall & Injury Prevention, visit: [email or web address] OR  [email or web address] OR  https://www.cdc.gov/steadi/patient.html sensory intact

## 2023-07-19 NOTE — PATIENT PROFILE ADULT - FALL HARM RISK - HARM RISK INTERVENTIONS
Assistance with ambulation/Assistance OOB with selected safe patient handling equipment/Communicate Risk of Fall with Harm to all staff/Discuss with provider need for PT consult/Monitor gait and stability/Provide patient with walking aids - walker, cane, crutches/Reinforce activity limits and safety measures with patient and family/Sit up slowly, dangle for a short time, stand at bedside before walking/Tailored Fall Risk Interventions/Use of alarms - bed, chair and/or voice tab/Visual Cue: Yellow wristband and red socks/Bed in lowest position, wheels locked, appropriate side rails in place/Call bell, personal items and telephone in reach/Instruct patient to call for assistance before getting out of bed or chair/Non-slip footwear when patient is out of bed/[place] to call system/Physically safe environment - no spills, clutter or unnecessary equipment/Purposeful Proactive Rounding/Room/bathroom lighting operational, light cord in reach

## 2023-07-20 LAB
ANION GAP SERPL CALC-SCNC: 10 MMOL/L — SIGNIFICANT CHANGE UP (ref 5–17)
APTT BLD: 29.2 SEC — SIGNIFICANT CHANGE UP (ref 27.5–35.5)
BUN SERPL-MCNC: 18.4 MG/DL — SIGNIFICANT CHANGE UP (ref 8–20)
CALCIUM SERPL-MCNC: 8.7 MG/DL — SIGNIFICANT CHANGE UP (ref 8.4–10.5)
CHLORIDE SERPL-SCNC: 96 MMOL/L — SIGNIFICANT CHANGE UP (ref 96–108)
CO2 SERPL-SCNC: 25 MMOL/L — SIGNIFICANT CHANGE UP (ref 22–29)
CREAT SERPL-MCNC: 1.09 MG/DL — SIGNIFICANT CHANGE UP (ref 0.5–1.3)
EGFR: 56 ML/MIN/1.73M2 — LOW
GLUCOSE SERPL-MCNC: 144 MG/DL — HIGH (ref 70–99)
HCT VFR BLD CALC: 26.8 % — LOW (ref 34.5–45)
HGB BLD-MCNC: 8.9 G/DL — LOW (ref 11.5–15.5)
INR BLD: 1.25 RATIO — HIGH (ref 0.88–1.16)
MCHC RBC-ENTMCNC: 30.3 PG — SIGNIFICANT CHANGE UP (ref 27–34)
MCHC RBC-ENTMCNC: 33.2 GM/DL — SIGNIFICANT CHANGE UP (ref 32–36)
MCV RBC AUTO: 91.2 FL — SIGNIFICANT CHANGE UP (ref 80–100)
PLATELET # BLD AUTO: 139 K/UL — LOW (ref 150–400)
POTASSIUM SERPL-MCNC: 3.5 MMOL/L — SIGNIFICANT CHANGE UP (ref 3.5–5.3)
POTASSIUM SERPL-SCNC: 3.5 MMOL/L — SIGNIFICANT CHANGE UP (ref 3.5–5.3)
PROTHROM AB SERPL-ACNC: 14.5 SEC — HIGH (ref 10.5–13.4)
RBC # BLD: 2.94 M/UL — LOW (ref 3.8–5.2)
RBC # FLD: 14.6 % — HIGH (ref 10.3–14.5)
SODIUM SERPL-SCNC: 131 MMOL/L — LOW (ref 135–145)
WBC # BLD: 6.75 K/UL — SIGNIFICANT CHANGE UP (ref 3.8–10.5)
WBC # FLD AUTO: 6.75 K/UL — SIGNIFICANT CHANGE UP (ref 3.8–10.5)

## 2023-07-20 PROCEDURE — 99232 SBSQ HOSP IP/OBS MODERATE 35: CPT

## 2023-07-20 PROCEDURE — 71275 CT ANGIOGRAPHY CHEST: CPT | Mod: 26

## 2023-07-20 RX ORDER — APIXABAN 2.5 MG/1
1 TABLET, FILM COATED ORAL
Qty: 28 | Refills: 0
Start: 2023-07-20 | End: 2023-08-02

## 2023-07-20 RX ORDER — CELECOXIB 200 MG/1
1 CAPSULE ORAL
Qty: 42 | Refills: 0
Start: 2023-07-20 | End: 2023-08-09

## 2023-07-20 RX ORDER — HYDROCHLOROTHIAZIDE 25 MG
12.5 TABLET ORAL
Qty: 0 | Refills: 0 | DISCHARGE
Start: 2023-07-20

## 2023-07-20 RX ORDER — APIXABAN 2.5 MG/1
2.5 TABLET, FILM COATED ORAL EVERY 12 HOURS
Refills: 0 | Status: DISCONTINUED | OUTPATIENT
Start: 2023-07-20 | End: 2023-07-21

## 2023-07-20 RX ADMIN — HYDROMORPHONE HYDROCHLORIDE 4 MILLIGRAM(S): 2 INJECTION INTRAMUSCULAR; INTRAVENOUS; SUBCUTANEOUS at 20:46

## 2023-07-20 RX ADMIN — Medication 975 MILLIGRAM(S): at 21:29

## 2023-07-20 RX ADMIN — Medication 81 MILLIGRAM(S): at 06:01

## 2023-07-20 RX ADMIN — SENNA PLUS 2 TABLET(S): 8.6 TABLET ORAL at 21:30

## 2023-07-20 RX ADMIN — Medication 975 MILLIGRAM(S): at 06:01

## 2023-07-20 RX ADMIN — PANTOPRAZOLE SODIUM 40 MILLIGRAM(S): 20 TABLET, DELAYED RELEASE ORAL at 06:01

## 2023-07-20 RX ADMIN — POLYETHYLENE GLYCOL 3350 17 GRAM(S): 17 POWDER, FOR SOLUTION ORAL at 21:30

## 2023-07-20 RX ADMIN — Medication 81 MILLIGRAM(S): at 16:50

## 2023-07-20 RX ADMIN — Medication 975 MILLIGRAM(S): at 06:36

## 2023-07-20 RX ADMIN — CELECOXIB 200 MILLIGRAM(S): 200 CAPSULE ORAL at 06:36

## 2023-07-20 RX ADMIN — Medication 975 MILLIGRAM(S): at 11:30

## 2023-07-20 RX ADMIN — ESCITALOPRAM OXALATE 10 MILLIGRAM(S): 10 TABLET, FILM COATED ORAL at 11:31

## 2023-07-20 RX ADMIN — Medication 975 MILLIGRAM(S): at 12:30

## 2023-07-20 RX ADMIN — APIXABAN 2.5 MILLIGRAM(S): 2.5 TABLET, FILM COATED ORAL at 19:47

## 2023-07-20 RX ADMIN — HYDROMORPHONE HYDROCHLORIDE 4 MILLIGRAM(S): 2 INJECTION INTRAMUSCULAR; INTRAVENOUS; SUBCUTANEOUS at 06:04

## 2023-07-20 RX ADMIN — HYDROMORPHONE HYDROCHLORIDE 4 MILLIGRAM(S): 2 INJECTION INTRAMUSCULAR; INTRAVENOUS; SUBCUTANEOUS at 19:46

## 2023-07-20 RX ADMIN — CELECOXIB 200 MILLIGRAM(S): 200 CAPSULE ORAL at 06:01

## 2023-07-20 RX ADMIN — CELECOXIB 200 MILLIGRAM(S): 200 CAPSULE ORAL at 16:49

## 2023-07-21 VITALS
HEART RATE: 72 BPM | TEMPERATURE: 98 F | OXYGEN SATURATION: 92 % | DIASTOLIC BLOOD PRESSURE: 58 MMHG | SYSTOLIC BLOOD PRESSURE: 96 MMHG | RESPIRATION RATE: 16 BRPM

## 2023-07-21 LAB
ALBUMIN SERPL ELPH-MCNC: 2.9 G/DL — LOW (ref 3.3–5.2)
ALP SERPL-CCNC: 81 U/L — SIGNIFICANT CHANGE UP (ref 40–120)
ALT FLD-CCNC: 64 U/L — HIGH
ANION GAP SERPL CALC-SCNC: 10 MMOL/L — SIGNIFICANT CHANGE UP (ref 5–17)
AST SERPL-CCNC: 68 U/L — HIGH
BILIRUB SERPL-MCNC: 0.6 MG/DL — SIGNIFICANT CHANGE UP (ref 0.4–2)
BUN SERPL-MCNC: 18.4 MG/DL — SIGNIFICANT CHANGE UP (ref 8–20)
CALCIUM SERPL-MCNC: 8.9 MG/DL — SIGNIFICANT CHANGE UP (ref 8.4–10.5)
CHLORIDE SERPL-SCNC: 99 MMOL/L — SIGNIFICANT CHANGE UP (ref 96–108)
CO2 SERPL-SCNC: 26 MMOL/L — SIGNIFICANT CHANGE UP (ref 22–29)
CREAT SERPL-MCNC: 1.1 MG/DL — SIGNIFICANT CHANGE UP (ref 0.5–1.3)
EGFR: 55 ML/MIN/1.73M2 — LOW
GLUCOSE SERPL-MCNC: 102 MG/DL — HIGH (ref 70–99)
HCT VFR BLD CALC: 27.1 % — LOW (ref 34.5–45)
HGB BLD-MCNC: 8.9 G/DL — LOW (ref 11.5–15.5)
MCHC RBC-ENTMCNC: 30.1 PG — SIGNIFICANT CHANGE UP (ref 27–34)
MCHC RBC-ENTMCNC: 32.8 GM/DL — SIGNIFICANT CHANGE UP (ref 32–36)
MCV RBC AUTO: 91.6 FL — SIGNIFICANT CHANGE UP (ref 80–100)
PLATELET # BLD AUTO: 125 K/UL — LOW (ref 150–400)
POTASSIUM SERPL-MCNC: 3.4 MMOL/L — LOW (ref 3.5–5.3)
POTASSIUM SERPL-SCNC: 3.4 MMOL/L — LOW (ref 3.5–5.3)
PROT SERPL-MCNC: 5.3 G/DL — LOW (ref 6.6–8.7)
RBC # BLD: 2.96 M/UL — LOW (ref 3.8–5.2)
RBC # FLD: 14.4 % — SIGNIFICANT CHANGE UP (ref 10.3–14.5)
SODIUM SERPL-SCNC: 135 MMOL/L — SIGNIFICANT CHANGE UP (ref 135–145)
WBC # BLD: 3.64 K/UL — LOW (ref 3.8–10.5)
WBC # FLD AUTO: 3.64 K/UL — LOW (ref 3.8–10.5)

## 2023-07-21 PROCEDURE — 86901 BLOOD TYPING SEROLOGIC RH(D): CPT

## 2023-07-21 PROCEDURE — C1713: CPT

## 2023-07-21 PROCEDURE — 86900 BLOOD TYPING SEROLOGIC ABO: CPT

## 2023-07-21 PROCEDURE — 80048 BASIC METABOLIC PNL TOTAL CA: CPT

## 2023-07-21 PROCEDURE — 85027 COMPLETE CBC AUTOMATED: CPT

## 2023-07-21 PROCEDURE — 80053 COMPREHEN METABOLIC PANEL: CPT

## 2023-07-21 PROCEDURE — 71275 CT ANGIOGRAPHY CHEST: CPT

## 2023-07-21 PROCEDURE — 73560 X-RAY EXAM OF KNEE 1 OR 2: CPT

## 2023-07-21 PROCEDURE — 97116 GAIT TRAINING THERAPY: CPT

## 2023-07-21 PROCEDURE — 85610 PROTHROMBIN TIME: CPT

## 2023-07-21 PROCEDURE — 99232 SBSQ HOSP IP/OBS MODERATE 35: CPT

## 2023-07-21 PROCEDURE — 97110 THERAPEUTIC EXERCISES: CPT

## 2023-07-21 PROCEDURE — C1776: CPT

## 2023-07-21 PROCEDURE — 36415 COLL VENOUS BLD VENIPUNCTURE: CPT

## 2023-07-21 PROCEDURE — 86850 RBC ANTIBODY SCREEN: CPT

## 2023-07-21 PROCEDURE — 85730 THROMBOPLASTIN TIME PARTIAL: CPT

## 2023-07-21 RX ADMIN — PANTOPRAZOLE SODIUM 40 MILLIGRAM(S): 20 TABLET, DELAYED RELEASE ORAL at 05:26

## 2023-07-21 RX ADMIN — HYDROMORPHONE HYDROCHLORIDE 4 MILLIGRAM(S): 2 INJECTION INTRAMUSCULAR; INTRAVENOUS; SUBCUTANEOUS at 06:26

## 2023-07-21 RX ADMIN — Medication 975 MILLIGRAM(S): at 06:26

## 2023-07-21 RX ADMIN — CELECOXIB 200 MILLIGRAM(S): 200 CAPSULE ORAL at 05:25

## 2023-07-21 RX ADMIN — APIXABAN 2.5 MILLIGRAM(S): 2.5 TABLET, FILM COATED ORAL at 05:26

## 2023-07-21 RX ADMIN — CELECOXIB 200 MILLIGRAM(S): 200 CAPSULE ORAL at 06:26

## 2023-07-21 RX ADMIN — HYDROMORPHONE HYDROCHLORIDE 4 MILLIGRAM(S): 2 INJECTION INTRAMUSCULAR; INTRAVENOUS; SUBCUTANEOUS at 05:26

## 2023-07-21 RX ADMIN — Medication 975 MILLIGRAM(S): at 05:25

## 2023-07-21 NOTE — PROGRESS NOTE ADULT - SUBJECTIVE AND OBJECTIVE BOX
HALIMA KENT    [id number]    67y      Female    Patient is a 67y old  Female who presents with a chief complaint of bilateral knee DJD (18 Jul 2023 20:38)      INTERVAL HPI/OVERNIGHT EVENTS:    Patient pain is well controlled, denies sob or  chest pain, her O2 sat was 88 and require 2 liters, CTA done came negative for PE,       Vital Signs Last 24 Hrs  T(C): 37.2 (20 Jul 2023 10:29), Max: 39.1 (20 Jul 2023 05:50)  T(F): 98.9 (20 Jul 2023 10:29), Max: 102.4 (20 Jul 2023 05:50)  HR: 89 (20 Jul 2023 10:29) (66 - 94)  BP: 112/58 (20 Jul 2023 10:29) (105/58 - 128/67)  RR: 18 (20 Jul 2023 10:29) (18 - 19)  SpO2: 93% (20 Jul 2023 10:29) (86% - 97%)    Parameters below as of 20 Jul 2023 10:29  Patient On (Oxygen Delivery Method): room air        PHYSICAL EXAM:    GENERAL: Middle age female looking comfortable   HEENT: PERRL, +EOMI  NECK: soft, Supple, No JVD  CHEST/LUNG: Decrease air entry bilaterally; No wheezing  HEART: S1S2+, Regular rate and rhythm; No murmurs  ABDOMEN: Soft, Nontender, Nondistended; Bowel sounds present  EXTREMITIES:  1+ Peripheral Pulses, No edema, B/L Knees with clean dressings on  SKIN: No rashes or lesions  NEURO: AAOX3  PSYCH: normal mood      LABS:                        8.9    6.75  )-----------( 139      ( 20 Jul 2023 11:18 )             26.8     07-20    131<L>  |  96  |  18.4  ----------------------------<  144<H>  3.5   |  25.0  |  1.09    Ca    8.7      20 Jul 2023 11:18      PT/INR - ( 20 Jul 2023 11:18 )   PT: 14.5 sec;   INR: 1.25 ratio         PTT - ( 20 Jul 2023 11:18 )  PTT:29.2 sec  Urinalysis Basic - ( 20 Jul 2023 11:18 )    Color: x / Appearance: x / SG: x / pH: x  Gluc: 144 mg/dL / Ketone: x  / Bili: x / Urobili: x   Blood: x / Protein: x / Nitrite: x   Leuk Esterase: x / RBC: x / WBC x   Sq Epi: x / Non Sq Epi: x / Bacteria: x          I&O's Summary    19 Jul 2023 07:01  -  20 Jul 2023 07:00  --------------------------------------------------------  IN: 1000 mL / OUT: 0 mL / NET: 1000 mL        MEDICATIONS  (STANDING):  acetaminophen     Tablet .. 975 milliGRAM(s) Oral every 8 hours  aspirin enteric coated 81 milliGRAM(s) Oral two times a day  celecoxib 200 milliGRAM(s) Oral every 12 hours  escitalopram 10 milliGRAM(s) Oral daily  hydrochlorothiazide 12.5 milliGRAM(s) Oral daily  pantoprazole    Tablet 40 milliGRAM(s) Oral before breakfast  polyethylene glycol 3350 17 Gram(s) Oral at bedtime  senna 2 Tablet(s) Oral at bedtime  sodium chloride 0.9%. 1000 milliLiter(s) (125 mL/Hr) IV Continuous <Continuous>    MEDICATIONS  (PRN):  aluminum hydroxide/magnesium hydroxide/simethicone Suspension 30 milliLiter(s) Oral four times a day PRN Indigestion  bisacodyl Suppository 10 milliGRAM(s) Rectal once PRN Constipation  HYDROmorphone   Tablet 4 milliGRAM(s) Oral every 3 hours PRN Severe Pain (7 - 10)  magnesium hydroxide Suspension 30 milliLiter(s) Oral daily PRN Constipation  ondansetron Injectable 4 milliGRAM(s) IV Push every 6 hours PRN Nausea and/or Vomiting  oxyCODONE    IR 5 milliGRAM(s) Oral every 3 hours PRN Mild Pain (1 - 3)  oxyCODONE    IR 10 milliGRAM(s) Oral every 3 hours PRN Moderate Pain (4 - 6)  zolpidem 5 milliGRAM(s) Oral at bedtime PRN Insomnia  zolpidem 5 milliGRAM(s) Oral at bedtime PRN Insomnia        
HALIMA KENT    [id number]    History: 67y Female is status post bilateral total knee arthroplasty, POD #1. Patient is doing well and is comfortable. The patient's pain is controlled using the prescribed pain medications. The patient is participating in physical therapy. Denies nausea, vomiting, chest pain, shortness of breath, abdominal pain or fever. No new complaints.    Vital Signs Last 24 Hrs  T(C): 36.4 (19 Jul 2023 05:38), Max: 36.6 (18 Jul 2023 19:30)  T(F): 97.5 (19 Jul 2023 05:38), Max: 97.9 (18 Jul 2023 19:30)  HR: 61 (19 Jul 2023 05:38) (52 - 69)  BP: 111/61 (19 Jul 2023 05:38) (104/62 - 137/52)  BP(mean): 80 (18 Jul 2023 20:20) (66 - 91)  RR: 18 (19 Jul 2023 05:38) (15 - 18)  SpO2: 93% (19 Jul 2023 05:38) (93% - 100%)    Parameters below as of 19 Jul 2023 05:38  Patient On (Oxygen Delivery Method): room air    MEDICATIONS  (STANDING):  acetaminophen     Tablet .. 975 milliGRAM(s) Oral every 8 hours  aspirin enteric coated 81 milliGRAM(s) Oral two times a day  escitalopram 10 milliGRAM(s) Oral daily  hydrochlorothiazide 12.5 milliGRAM(s) Oral daily  pantoprazole    Tablet 40 milliGRAM(s) Oral before breakfast  polyethylene glycol 3350 17 Gram(s) Oral at bedtime  senna 2 Tablet(s) Oral at bedtime  sodium chloride 0.9%. 1000 milliLiter(s) (125 mL/Hr) IV Continuous <Continuous>    MEDICATIONS  (PRN):  aluminum hydroxide/magnesium hydroxide/simethicone Suspension 30 milliLiter(s) Oral four times a day PRN Indigestion  HYDROmorphone   Tablet 4 milliGRAM(s) Oral every 3 hours PRN Severe Pain (7 - 10)  magnesium hydroxide Suspension 30 milliLiter(s) Oral daily PRN Constipation  ondansetron Injectable 4 milliGRAM(s) IV Push every 6 hours PRN Nausea and/or Vomiting  oxyCODONE    IR 5 milliGRAM(s) Oral every 3 hours PRN Mild Pain (1 - 3)  oxyCODONE    IR 10 milliGRAM(s) Oral every 3 hours PRN Moderate Pain (4 - 6)  zolpidem 5 milliGRAM(s) Oral at bedtime PRN Insomnia  zolpidem 5 milliGRAM(s) Oral at bedtime PRN Insomnia    Physical exam: The right and left knee ACE dressings are clean, dry and intact. The calves are supple nontender. Passive range of motion is acceptable to due postoperative pain. Sensation to light touch is grossly intact distally. Motor function distally is 5/5. Extensor hallucis longus and flexor hallucis longus are intact. No foot drop. 2+ dorsalis pedis pulse. Capillary refill is less than 2 seconds. No cyanosis.    Primary Orthopedic Assessment:  • s/p BILATERAL total knee replacement    Secondary  Medical Assessment(s):   • PMH: RA    Plan:   • DVT prophylaxis as prescribed, including use of compression devices and ankle pumps  • Continue physical therapy  • Weightbearing as tolerated of the right lower extremity with assistance of a walker  • Incentive spirometry encouraged  • Pain control as clinically indicated  • Discharge planning – anticipated discharge is Home when cleared by PT and Medicine   
HALIMA KNET    [id number]    History: 67y Female is status post bilateral total knee arthroplastys on POD # 2. Patient is doing well and is comfortable. The patient's pain is controlled using the prescribed pain medications. The patient is participating in physical therapy. Denies nausea, vomiting, chest pain, shortness of breath, abdominal pain or fever. The patient experienced a low O2 sat of 86 earlier this morning. The patient was asymptomatic at the time. The patient was placed on 2L O@ via NC and was sating 96 on 2L.                MEDICATIONS  (STANDING):  acetaminophen     Tablet .. 975 milliGRAM(s) Oral every 8 hours  aspirin enteric coated 81 milliGRAM(s) Oral two times a day  celecoxib 200 milliGRAM(s) Oral every 12 hours  escitalopram 10 milliGRAM(s) Oral daily  hydrochlorothiazide 12.5 milliGRAM(s) Oral daily  pantoprazole    Tablet 40 milliGRAM(s) Oral before breakfast  polyethylene glycol 3350 17 Gram(s) Oral at bedtime  senna 2 Tablet(s) Oral at bedtime  sodium chloride 0.9%. 1000 milliLiter(s) (125 mL/Hr) IV Continuous <Continuous>    MEDICATIONS  (PRN):  aluminum hydroxide/magnesium hydroxide/simethicone Suspension 30 milliLiter(s) Oral four times a day PRN Indigestion  bisacodyl Suppository 10 milliGRAM(s) Rectal once PRN Constipation  HYDROmorphone   Tablet 4 milliGRAM(s) Oral every 3 hours PRN Severe Pain (7 - 10)  magnesium hydroxide Suspension 30 milliLiter(s) Oral daily PRN Constipation  ondansetron Injectable 4 milliGRAM(s) IV Push every 6 hours PRN Nausea and/or Vomiting  oxyCODONE    IR 5 milliGRAM(s) Oral every 3 hours PRN Mild Pain (1 - 3)  oxyCODONE    IR 10 milliGRAM(s) Oral every 3 hours PRN Moderate Pain (4 - 6)  zolpidem 5 milliGRAM(s) Oral at bedtime PRN Insomnia  zolpidem 5 milliGRAM(s) Oral at bedtime PRN Insomnia      Physical exam: The bilateral knee dressing is clean, dry and intact. No drainage or discharge. No erythema is noted. No blistering. No ecchymosis. Calf soft, non-tender b/l. Sensation to light touch is grossly intact distally. Extensor hallucis longus and flexor hallucis longus are intact. No foot drop. 2+ dorsalis pedis pulse. Capillary refill is less than 2 seconds.    Primary Orthopedic Assessment:  • s/p RIGHT total knee replacement    Secondary  Orthopedic Assessment(s):   •     Secondary  Medical Assessment(s):   •     Plan:   • DVT prophylaxis as prescribed, including use of compression devices and ankle pumps  • Continue physical therapy  • Weightbearing as tolerated of the right lower extremity with assistance of a walker  • Incentive spirometry encouraged  • Pain control as clinically indicated  • Case discussed with Medicine. As per Medicine, f/u CTPA to rule out PE.   • Discharge planning – anticipated discharge is for home when medically cleared.    
HALIMA KENT    [id number]    67y      Female    Patient is a 67y old  Female who presents with a chief complaint of bilateral knee DJD (18 Jul 2023 20:38)      INTERVAL HPI/OVERNIGHT EVENTS:    Patient is doing ok, no more hypoxemia, O2 is ~92, goes up with ambulation, CTA came negative for PE, denies sob, chest pain.       Vital Signs Last 24 Hrs  T(C): 36.5 (21 Jul 2023 09:51), Max: 37 (20 Jul 2023 20:33)  T(F): 97.7 (21 Jul 2023 09:51), Max: 98.6 (20 Jul 2023 20:33)  HR: 72 (21 Jul 2023 09:51) (72 - 87)  BP: 96/58 (21 Jul 2023 09:51) (96/58 - 118/63)  RR: 16 (21 Jul 2023 09:51) (16 - 19)  SpO2: 92% (21 Jul 2023 09:51) (92% - 97%)    Parameters below as of 21 Jul 2023 09:51  Patient On (Oxygen Delivery Method): room air        PHYSICAL EXAM:    GENERAL: Middle age female looking comfortable   HEENT: PERRL, +EOMI  NECK: soft, Supple, No JVD  CHEST/LUNG: Decrease air entry bilaterally; No wheezing  HEART: S1S2+, Regular rate and rhythm; No murmurs  ABDOMEN: Soft, Nontender, Nondistended; Bowel sounds present  EXTREMITIES:  1+ Peripheral Pulses, No edema, B/L Knees with clean dressings on  SKIN: No rashes or lesions  NEURO: AAOX3  PSYCH: normal mood      LABS:                        8.9    3.64  )-----------( 125      ( 21 Jul 2023 05:40 )             27.1     07-21    135  |  99  |  18.4  ----------------------------<  102<H>  3.4<L>   |  26.0  |  1.10    Ca    8.9      21 Jul 2023 05:40    TPro  5.3<L>  /  Alb  2.9<L>  /  TBili  0.6  /  DBili  x   /  AST  68<H>  /  ALT  64<H>  /  AlkPhos  81  07-21    PT/INR - ( 20 Jul 2023 11:18 )   PT: 14.5 sec;   INR: 1.25 ratio         PTT - ( 20 Jul 2023 11:18 )  PTT:29.2 sec  Urinalysis Basic - ( 21 Jul 2023 05:40 )    Color: x / Appearance: x / SG: x / pH: x  Gluc: 102 mg/dL / Ketone: x  / Bili: x / Urobili: x   Blood: x / Protein: x / Nitrite: x   Leuk Esterase: x / RBC: x / WBC x   Sq Epi: x / Non Sq Epi: x / Bacteria: x          I&O's Summary    20 Jul 2023 07:01  -  21 Jul 2023 07:00  --------------------------------------------------------  IN: 240 mL / OUT: 0 mL / NET: 240 mL        MEDICATIONS  (STANDING):  acetaminophen     Tablet .. 975 milliGRAM(s) Oral every 8 hours  apixaban 2.5 milliGRAM(s) Oral every 12 hours  celecoxib 200 milliGRAM(s) Oral every 12 hours  escitalopram 10 milliGRAM(s) Oral daily  hydrochlorothiazide 12.5 milliGRAM(s) Oral daily  pantoprazole    Tablet 40 milliGRAM(s) Oral before breakfast  polyethylene glycol 3350 17 Gram(s) Oral at bedtime  senna 2 Tablet(s) Oral at bedtime  sodium chloride 0.9%. 1000 milliLiter(s) (125 mL/Hr) IV Continuous <Continuous>    MEDICATIONS  (PRN):  aluminum hydroxide/magnesium hydroxide/simethicone Suspension 30 milliLiter(s) Oral four times a day PRN Indigestion  bisacodyl Suppository 10 milliGRAM(s) Rectal once PRN Constipation  HYDROmorphone   Tablet 4 milliGRAM(s) Oral every 3 hours PRN Severe Pain (7 - 10)  magnesium hydroxide Suspension 30 milliLiter(s) Oral daily PRN Constipation  ondansetron Injectable 4 milliGRAM(s) IV Push every 6 hours PRN Nausea and/or Vomiting  oxyCODONE    IR 5 milliGRAM(s) Oral every 3 hours PRN Mild Pain (1 - 3)  oxyCODONE    IR 10 milliGRAM(s) Oral every 3 hours PRN Moderate Pain (4 - 6)  zolpidem 5 milliGRAM(s) Oral at bedtime PRN Insomnia  zolpidem 5 milliGRAM(s) Oral at bedtime PRN Insomnia        
HALIMA KENT  [id number]    History: 67y Female is status post bilateral total knee arthroplasty on POD #3. Patient is doing well and is comfortable. The patient's pain is controlled using the prescribed pain medications. The patient is participating in physical therapy. Denies CP, SOB, dizziness, HA, fever/chills, numbness or tingling. No new complaints.    Vital Signs Last 24 Hrs  T(C): 36.7 (21 Jul 2023 05:14), Max: 37.2 (20 Jul 2023 10:29)  T(F): 98.1 (21 Jul 2023 05:14), Max: 98.9 (20 Jul 2023 10:29)  HR: 74 (21 Jul 2023 05:14) (74 - 89)  BP: 110/62 (21 Jul 2023 05:14) (110/62 - 118/63)  BP(mean): --  RR: 17 (21 Jul 2023 05:14) (17 - 19)  SpO2: 92% (21 Jul 2023 05:14) (92% - 97%)    Parameters below as of 21 Jul 2023 05:14  Patient On (Oxygen Delivery Method): room air                              8.9    3.64  )-----------( 125      ( 21 Jul 2023 05:40 )             27.1     07-21    135  |  99  |  18.4  ----------------------------<  102<H>  3.4<L>   |  26.0  |  1.10    Ca    8.9      21 Jul 2023 05:40    TPro  5.3<L>  /  Alb  2.9<L>  /  TBili  0.6  /  DBili  x   /  AST  68<H>  /  ALT  64<H>  /  AlkPhos  81  07-21      General: Alert, awake, NAD  Physical exam: The b/l knee ACE dressing is clean, dry and intact. No drainage, discharge noted. ACE removed. B/l dressing clean, dry, intact. no bleeding or discharge noted.   The calf is supple nontender b/l.   SILT. +AT/GC/EHL/FHL.   2+ DP pulse. BCR. No cyanosis.    Plan:   - DVT prophylaxis as prescribed, including use of compression devices and ankle pumps  - Continue physical therapy  - Weight bearing status of surgical extremity: WBAT  - Incentive spirometry encouraged  - Pain control as clinically indicated  - Discharge planning – anticipated discharge is Home when cleared by PT and Medicine    
HALIMA KENT  [id number]    History: 67y Female is status post left total knee arthroplasty on POD # 0. Patient is doing well and is comfortable. The patient's pain is controlled using the prescribed pain medications. The patient is participating in physical therapy. Denies CP, SOB, dizziness, HA, fever/chills, numbness or tingling. No new complaints.    Vital Signs Last 24 Hrs  T(C): 36.4 (18 Jul 2023 20:20), Max: 36.6 (18 Jul 2023 19:30)  T(F): 97.6 (18 Jul 2023 20:20), Max: 97.9 (18 Jul 2023 19:30)  HR: 55 (18 Jul 2023 20:20) (52 - 69)  BP: 137/52 (18 Jul 2023 20:20) (108/52 - 137/52)  BP(mean): 80 (18 Jul 2023 20:20) (66 - 91)  RR: 18 (18 Jul 2023 20:20) (15 - 18)  SpO2: 97% (18 Jul 2023 20:20) (95% - 100%)    Parameters below as of 18 Jul 2023 20:20  Patient On (Oxygen Delivery Method): room air      General: Alert, awake, NAD  Physical exam: The left and right knee dressings are clean, dry and intact. No drainage, discharge, erythema, blistering or ecchymosis noted.   The calf is supple nontender b/l.   SILT. +AT/GC/EHL/FHL intact b/l  2+ DP pulse b/l. BCR. No cyanosis.    Plan:   - DVT prophylaxis as prescribed, including use of compression devices and ankle pumps  - Continue physical therapy  - Weight bearing status of surgical extremity: bilateral LE WBAT  - Incentive spirometry encouraged  - Pain control as clinically indicated  - Post op ABX per protocol

## 2023-07-21 NOTE — PROGRESS NOTE ADULT - ASSESSMENT
66 yo F Hx of RA (on methotrexate and Humira injections every 2 weeks), she has bilateral knee pain, left>right. She has Hx of advanced medial and patellofemoral osteoarthritis of both knees. She has been in PT which has been helping her a little, pain was worsening and conservative treatment such as HA injections and cortisone injections were unhelpful. She was scheduled for simultaneous bilateral TKRs on 2/7/2023 which she cancelled. She was following pain management with Dr Curtis who has given her epidural injections, she came in here for BV/L TKR w/Dr Grewal on 7/18/2023 s/p procedure.     Plan:     B/L Knee OA s/p TKRs post op day 03:     - post op no complications  - VS stable  - abx per ortho   - opiate induced constipation regimen   - encouraging incentive spirometry   -c/w local wound care per ortho   -DVT prophylaxis and Pain meds as per Ortho team   -PT/OT and weight bearing per ortho     Hypoxia: likely from the Atelectasis CTA done showed no PE, encouraged to use IS, could be from underlying sleep apnea, monitored overnight O2 is ~92, goes up with ambulation, she was told to continue with Incentive spirometry, likely need sleep study as out patient       RA: Will continue with METHOTREXATE 50 MG/2 ML q weekly on Friday, FOLIC ACID  1mg daily.     HTN: will resume HYDROCHLOROTHIAZIDE  12.5mg daily upon d/c     Insomnia: Will continue with ZOLPIDEM 10MG at night    Hx of Depression: Will continue with ESCITALOPRAM 10mg daily     Patient is stable from the medicine point of view for discharge pending PT and Ortho eval.       
68 yo F Hx of RA (on methotrexate and Humira injections every 2 weeks), she has bilateral knee pain, left>right. She has Hx of advanced medial and patellofemoral osteoarthritis of both knees. She has been in PT which has been helping her a little, pain was worsening and conservative treatment such as HA injections and cortisone injections were unhelpful. She was scheduled for simultaneous bilateral TKRs on 2/7/2023 which she cancelled. She was following pain management with Dr Curtis who has given her epidural injections, she came in here for BV/L TKR w/Dr Grewal on 7/18/2023 s/p procedure.     Plan:     B/L Knee OA s/p TKRs post op day 02:     - post op no complications  - VS stable  - abx per ortho   - opiate induced constipation regimen   - encouraging incentive spirometry   -c/w local wound care per ortho   -DVT prophylaxis and Pain meds as per Ortho team   -PT/OT and weight bearing per ortho     Hypoxia: likely from the Atelectasis CTA done showed no PE, encouraged to use IS, could be from underlying sleep apenia as her O2 was guillermo g down while she was sleeping, will monitor overnight.       RA: Will continue with METHOTREXATE 50 MG/2 ML q weekly on Friday, FOLIC ACID  1mg daily.     HTN: will resume HYDROCHLOROTHIAZIDE  12.5mg daily upon d/c     Insomnia: Will continue with ZOLPIDEM 10MG at night    Hx of Depression: Will continue with ESCITALOPRAM 10mg daily

## 2023-07-25 ENCOUNTER — NON-APPOINTMENT (OUTPATIENT)
Age: 68
End: 2023-07-25

## 2023-08-09 ENCOUNTER — APPOINTMENT (OUTPATIENT)
Dept: ORTHOPEDIC SURGERY | Facility: CLINIC | Age: 68
End: 2023-08-09
Payer: OTHER MISCELLANEOUS

## 2023-08-09 VITALS
HEIGHT: 68 IN | HEART RATE: 75 BPM | WEIGHT: 214 LBS | BODY MASS INDEX: 32.43 KG/M2 | DIASTOLIC BLOOD PRESSURE: 56 MMHG | SYSTOLIC BLOOD PRESSURE: 91 MMHG

## 2023-08-09 PROCEDURE — 99024 POSTOP FOLLOW-UP VISIT: CPT

## 2023-08-09 PROCEDURE — 73562 X-RAY EXAM OF KNEE 3: CPT | Mod: 50

## 2023-08-09 NOTE — HISTORY OF PRESENT ILLNESS
[de-identified] : S/P: Bilateral total knee arthroplasty.  Bilateral computer-assisted tibial resection, OrthAlign procedure. DOS: 7/18/23 [de-identified] : Pt is 3 weeks s/p B/L TKA. She is doing well overall, no major knee pain. Reports mild numbness in medial knee. Using cryochamber with great pain relief. Currently completing home PT. Ambulating outside the home with a cane. Using Oxycodone BID to complete PT regimen.  Current symptoms include no chills, no constipation, no diarrhea, no dysuria, no fever, no nausea and no vomiting.  [de-identified] : B/L knee exam shows healing incision with no sign of infection, ROM 0-100 degrees. The surgical incision sites are swollen, but clean, dry and intact, healed, not erythematous and not dehisced. Additional findings included an unremarkable neurological exam, peripheral vascular exam normal and maneuvers demonstrated a negative Latanya's sign. [de-identified] : 5V xray of the B/L knee done in office today and reviewed by Dr. Jose Alfredo Grewal demonstrates s/p B/L TKA implants in good positioning with no evidence of wear, loosening, or subsidence.  [de-identified] : Postoperatively, the patient is doing well, is showing no signs of infection and has adequate pain control.  [de-identified] : Patient will continue with physical therapy/low impact exercise at home. Advised pt not to start any anti-scar topical tx until next visit. Pt understands the importance of prophylaxis for invasive dental procedures for 2 years. A renewal for the Oxycodone was ordered for the patient in the office today. Patient will return to office in 4 weeks.

## 2023-08-09 NOTE — END OF VISIT
[FreeTextEntry3] : I, Марина Rodriguez, acted solely as a scribe for Dr. Jose Alfredo Grewal on this date 08/09/2023 .

## 2023-08-29 ENCOUNTER — NON-APPOINTMENT (OUTPATIENT)
Age: 68
End: 2023-08-29

## 2023-08-30 ENCOUNTER — NON-APPOINTMENT (OUTPATIENT)
Age: 68
End: 2023-08-30

## 2023-09-05 ENCOUNTER — APPOINTMENT (OUTPATIENT)
Dept: ORTHOPEDIC SURGERY | Facility: CLINIC | Age: 68
End: 2023-09-05
Payer: OTHER MISCELLANEOUS

## 2023-09-05 VITALS
SYSTOLIC BLOOD PRESSURE: 111 MMHG | BODY MASS INDEX: 32.43 KG/M2 | WEIGHT: 214 LBS | HEART RATE: 70 BPM | HEIGHT: 68 IN | DIASTOLIC BLOOD PRESSURE: 64 MMHG

## 2023-09-05 PROCEDURE — 73562 X-RAY EXAM OF KNEE 3: CPT | Mod: 50

## 2023-09-05 PROCEDURE — 99024 POSTOP FOLLOW-UP VISIT: CPT

## 2023-09-05 NOTE — HISTORY OF PRESENT ILLNESS
[de-identified] : S/P: Bilateral total knee arthroplasty.  Bilateral computer-assisted tibial resection, OrthAlign procedure. DOS: 7/18/23. [de-identified] : Patient is 7 weeks from bilateral total knee arthroplasty she is in outpatient physical therapy she reports soreness at nighttime and she would like to take medication after PT she has tried for Tylenol with inadequate relief.  patient is ambulating without the cane 15 to 20 minutes a day  [de-identified] :  Bilateral kneeexam shows healing incision with no sign of infection, ROM   0 to 120 degree . The surgical incision site(s) swollen, but clean, dry and intact, healed, not erythematous and not dehisced. Additional findings included an unremarkable neurological exam, peripheral vascular exam normal and maneuvers demonstrated a negative Latanya's sign.   [de-identified] :  3V xray of the b/l knee done in the office today and reviewed and demonstrates s/p implants in good positioning with no evidence of wear, loosening, or subsidence.   [de-identified] : Patient will continue with the physical therapy and low impact exercise I discussed use of antibiotic before dental work for 2 years.  I provided a small quantity of tramadol for pain follow-up in 1 month

## 2023-09-19 ENCOUNTER — APPOINTMENT (OUTPATIENT)
Dept: RHEUMATOLOGY | Facility: CLINIC | Age: 68
End: 2023-09-19
Payer: OTHER MISCELLANEOUS

## 2023-09-19 VITALS
OXYGEN SATURATION: 100 % | HEART RATE: 65 BPM | HEIGHT: 68 IN | SYSTOLIC BLOOD PRESSURE: 127 MMHG | BODY MASS INDEX: 30.77 KG/M2 | WEIGHT: 203 LBS | DIASTOLIC BLOOD PRESSURE: 75 MMHG | TEMPERATURE: 97.4 F

## 2023-09-19 DIAGNOSIS — M17.9 OSTEOARTHRITIS OF KNEE, UNSPECIFIED: ICD-10-CM

## 2023-09-19 DIAGNOSIS — Z79.899 OTHER LONG TERM (CURRENT) DRUG THERAPY: ICD-10-CM

## 2023-09-19 PROBLEM — M17.0 BILATERAL PRIMARY OSTEOARTHRITIS OF KNEE: Chronic | Status: ACTIVE | Noted: 2023-06-28

## 2023-09-19 PROCEDURE — 99214 OFFICE O/P EST MOD 30 MIN: CPT

## 2023-09-19 RX ORDER — TRIAMCINOLONE ACETONIDE 5 MG/G
0.5 CREAM TOPICAL 3 TIMES DAILY
Qty: 60 | Refills: 3 | Status: DISCONTINUED | COMMUNITY
Start: 2022-06-20 | End: 2023-09-19

## 2023-09-19 RX ORDER — HYDROCHLOROTHIAZIDE 12.5 MG/1
12.5 TABLET ORAL
Refills: 0 | Status: DISCONTINUED | COMMUNITY
End: 2023-09-19

## 2023-09-19 RX ORDER — OXYCODONE 5 MG/1
5 TABLET ORAL EVERY 6 HOURS
Qty: 25 | Refills: 0 | Status: DISCONTINUED | COMMUNITY
Start: 2023-08-01 | End: 2023-09-19

## 2023-09-19 RX ORDER — TRAMADOL HYDROCHLORIDE 50 MG/1
50 TABLET, COATED ORAL
Qty: 20 | Refills: 0 | Status: DISCONTINUED | COMMUNITY
Start: 2023-09-05 | End: 2023-09-19

## 2023-09-27 ENCOUNTER — APPOINTMENT (OUTPATIENT)
Dept: ORTHOPEDIC SURGERY | Facility: CLINIC | Age: 68
End: 2023-09-27
Payer: OTHER MISCELLANEOUS

## 2023-09-27 VITALS — HEIGHT: 68 IN | WEIGHT: 203 LBS | BODY MASS INDEX: 30.77 KG/M2

## 2023-09-27 DIAGNOSIS — S86.011S STRAIN OF RIGHT ACHILLES TENDON, SEQUELA: ICD-10-CM

## 2023-09-27 PROCEDURE — 99213 OFFICE O/P EST LOW 20 MIN: CPT

## 2023-10-09 ENCOUNTER — APPOINTMENT (OUTPATIENT)
Dept: ORTHOPEDIC SURGERY | Facility: CLINIC | Age: 68
End: 2023-10-09
Payer: OTHER MISCELLANEOUS

## 2023-10-09 DIAGNOSIS — Z96.659 PRESENCE OF UNSPECIFIED ARTIFICIAL KNEE JOINT: ICD-10-CM

## 2023-10-09 PROCEDURE — 99024 POSTOP FOLLOW-UP VISIT: CPT

## 2023-10-09 PROCEDURE — 73562 X-RAY EXAM OF KNEE 3: CPT | Mod: 50

## 2023-11-27 ENCOUNTER — APPOINTMENT (OUTPATIENT)
Dept: ORTHOPEDIC SURGERY | Facility: CLINIC | Age: 68
End: 2023-11-27
Payer: MEDICARE

## 2023-11-27 VITALS
SYSTOLIC BLOOD PRESSURE: 137 MMHG | BODY MASS INDEX: 30.01 KG/M2 | HEIGHT: 68 IN | HEART RATE: 64 BPM | DIASTOLIC BLOOD PRESSURE: 77 MMHG | WEIGHT: 198 LBS

## 2023-11-27 PROCEDURE — 99214 OFFICE O/P EST MOD 30 MIN: CPT

## 2023-11-28 ENCOUNTER — NON-APPOINTMENT (OUTPATIENT)
Age: 68
End: 2023-11-28

## 2023-12-10 ENCOUNTER — NON-APPOINTMENT (OUTPATIENT)
Age: 68
End: 2023-12-10

## 2023-12-12 ENCOUNTER — RX RENEWAL (OUTPATIENT)
Age: 68
End: 2023-12-12

## 2024-01-10 ENCOUNTER — APPOINTMENT (OUTPATIENT)
Dept: ORTHOPEDIC SURGERY | Facility: CLINIC | Age: 69
End: 2024-01-10
Payer: OTHER MISCELLANEOUS

## 2024-01-10 VITALS — BODY MASS INDEX: 30.01 KG/M2 | HEIGHT: 68 IN | WEIGHT: 198 LBS

## 2024-01-10 DIAGNOSIS — G89.29 LUMBAGO WITH SCIATICA, RIGHT SIDE: ICD-10-CM

## 2024-01-10 DIAGNOSIS — M48.061 SPINAL STENOSIS, LUMBAR REGION WITHOUT NEUROGENIC CLAUDICATION: ICD-10-CM

## 2024-01-10 DIAGNOSIS — I89.0 LYMPHEDEMA, NOT ELSEWHERE CLASSIFIED: ICD-10-CM

## 2024-01-10 DIAGNOSIS — M76.30 ILIOTIBIAL BAND SYNDROME, UNSPECIFIED LEG: ICD-10-CM

## 2024-01-10 DIAGNOSIS — M54.41 LUMBAGO WITH SCIATICA, RIGHT SIDE: ICD-10-CM

## 2024-01-10 DIAGNOSIS — G57.93 UNSPECIFIED MONONEUROPATHY OF BILATERAL LOWER LIMBS: ICD-10-CM

## 2024-01-10 PROCEDURE — 99213 OFFICE O/P EST LOW 20 MIN: CPT

## 2024-01-10 NOTE — PHYSICAL EXAM
[Left] : left knee [Right] : right knee [5___] : quadriceps 5[unfilled]/5 [FreeTextEntry8] : belt like distribution lower back [] : no effusion [TWNoteComboBox7] : flexion 120 degrees

## 2024-01-10 NOTE — HISTORY OF PRESENT ILLNESS
[Lower back] : lower back [Work related] : work related [Gradual] : gradual [Sudden] : sudden [6] : 6 [5] : 5 [Burning] : burning [Dull/Aching] : dull/aching [Radiating] : radiating [Intermittent] : intermittent [Household chores] : household chores [Meds] : meds [Ice] : ice [Heat] : heat [Physical therapy] : physical therapy [Bending forward] : bending forward [Stairs] : stairs [Not working due to injury] : Work status: not working due to injury [de-identified] : Patient Complaint - WC 8/6/18/2018 Case [id number] 6/3/20: Bilateral thigh pain/burning x 1 week. History of lumbar condition/pain mgt STEVEN's and treatment. Right leg giving way episode. Also c/o R knee pain. Requesting injection. 6/10/20: f/u R knee Euflexxa #2 6/17/20: f/u R knee euflexxa #3, continued right leg weakness. Burning thighs - radiates to legs. Awaiting auth for MRI Lumbar spine. Pain knees with ambulation, buckling. Swelling R ankle. 7/15/20 f/u back and bilat knees MRI auth pending 8/24/2020: f/u margie knees better w/PT MRI LS auth pending 9/30/20 f/u R ankle and both knees 10/28/20 f/u R heel and margie knees HEP/PT MRI auth pending Not working 12/9/20 f/u R knee pain, bilateral radiating burning post leg pain and R achilles. HE/PT , Not working auth pending 1/20/21 Euflexxa #1 margie knees LBP radiating to margie thighs/knees 1/27/21 Euflexxa # 2 margie knees lbp radiating to legs. Waiting for auth for mri lumbar spine. 2/10/21: Euflexxa #3 margie knees. Just got auth for EMG. 3/10/21 f/u radiating lbp to margie thighs, knee pain HEP/PT 4/14/21: f/u L > R radiating leg pain to knees. Pain mgt PT Not working 5/26/21 f/u lbp radiating L > R postero-lateral thigh and ant med knee. HEP/PT/Pain mgt Not working 7/7/21 f/u lbp radiating to post thighs and L > R knee. Pain mgt pending 8/25/21 f/u b/l knees - pain with stairs and prolonged standing/ambulation. Aggravates R ankle issue and lb. Continued soreness. STEVEN's not auth. Following with pain mgmt. Dr. Cooper. > Left leg radiating pain. 9/29/21 f/u margie knees, low back. PT not authorized. Not working 12/29/21 f/u margie knees LS. Recent vein surgery Increasing lateral L knee poain. Pain mgt/STEVEN pending 2/9/22 f/u margie knees. pain mgmt. STEVEN pending. 6/22/22  f/u margie knees, back  Considering tka L > R at end of year 8/3/22  f/u margie knees lumbar spine  PT auth pending  Not working  Takes Naprosyn/Rheum 9/28/22  f/u lbp, margie knees.  Restarted PT  Pain Mgt  Takes MTx and Naprosyn prnNot working 11/9/22 f/u lbp, margie knees. She saw Dr. Godoy, pain mgmt, Dr. Starks, rheum on MTx, naprosyn helps her manage. She is scheduled for margie TKA 2/6/22 (Dr. Grewal) she continues to benefit from PT. MRI Lumbar spine - sacroiliits. Increased difficulty with ambulation, sense of leg heaviness.  1/11/23: f/u lbp, margie knees. She is attending PT. Waiting for auth to have TKR done.  3/1/23: f/u lbp, margie knees, R achilles.  Still waiting for Auth for TKR. Just finished PT.  Symptoms the same.  9/27/23: f/u margie knees and back. Back pain is stable. Still having intermittent pain and soreness. s/p margie  TKR 7/18/23 by Dr. Greawl. Going to PT.  Right ankle doing well. Now c/o swelling to her left ankle after her knee replacement surgery.  Uses stockings, off blood thinners 1/10/24: f/u margie knees and back. Going to PT with some relief. Taking celebrex with some relief.  Retired  [] : no [FreeTextEntry1] : knees and ankle [FreeTextEntry3] : 8-5-18 [FreeTextEntry7] : lower back  [de-identified] : over use

## 2024-01-31 ENCOUNTER — APPOINTMENT (OUTPATIENT)
Dept: ORTHOPEDIC SURGERY | Facility: CLINIC | Age: 69
End: 2024-01-31
Payer: OTHER MISCELLANEOUS

## 2024-01-31 VITALS
DIASTOLIC BLOOD PRESSURE: 71 MMHG | WEIGHT: 196 LBS | SYSTOLIC BLOOD PRESSURE: 149 MMHG | HEIGHT: 68 IN | HEART RATE: 61 BPM | BODY MASS INDEX: 29.7 KG/M2

## 2024-01-31 DIAGNOSIS — Z96.653 PRESENCE OF ARTIFICIAL KNEE JOINT, BILATERAL: ICD-10-CM

## 2024-01-31 DIAGNOSIS — Z96.653 AFTERCARE FOLLOWING JOINT REPLACEMENT SURGERY: ICD-10-CM

## 2024-01-31 DIAGNOSIS — Z47.1 AFTERCARE FOLLOWING JOINT REPLACEMENT SURGERY: ICD-10-CM

## 2024-01-31 PROCEDURE — 99214 OFFICE O/P EST MOD 30 MIN: CPT

## 2024-02-01 NOTE — HISTORY OF PRESENT ILLNESS
[de-identified] : 69 y/o F pt presents with bilateral knee pain. The pt is s/p bilateral TKA. The pt has been doing physical therapy. They gave her diagnosis of iliotibial band tendinitis. The pt is now having quad pain right now. This started when lifting 3 pounds during PT. The pt does not have any groin pain. She states her pain on her knees are stable. She is overall improving.  She notes no relief with Celebrex.  [FreeTextEntry1] : Symptoms and relevant review of symptoms: She fell on 8/5/18 and landed on both knees. She sustained a right ACL tendon rupture which was treated nonoperatively by an orthopedic surgeon. She describes her pain as sharp achy throbbing, stabbing and shooting. She has the most pain with stairs. Her last day of work was in February 2019--she is no longer working and is on disability by the orthopedist who is following her ankles. 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 The patient is currently not working.

## 2024-02-01 NOTE — END OF VISIT
[FreeTextEntry3] : I, Becky Schaffer, acted solely as a scribe for Dr. Jose Alfredo Grewal on 01/31/2024

## 2024-02-01 NOTE — PHYSICAL EXAM
[Normal] : Gait: normal [UE/LE] : Sensory: Intact in bilateral upper & lower extremities [ALL] : dorsalis pedis, posterior tibial, femoral, popliteal, and radial 2+ and symmetric bilaterally [de-identified] : GENERAL APPEARANCE:   Well nourished and hydrated, pleasant, alert, and oriented x 3.  Appears their stated age.   Respiratory: No wheeezing, breath sounds clear. No cyanosis, no us of accesory musculature  CARDIOVASCULAR:   No apparent abnormalities.  No lower leg edema.  No varicosities.  Pedal pulses are palpable. NEUROLOGIC:   Sensation is normal, no muscle weakness in the upper or lower extremities. DERMATOLOGIC:   No apparent skin lesions, moist, warm, no rash. SPINE:   Cervical spine appears normal and moves freely; thoracic spine appears normal and moves freely; lumbosacral spine appears normal and moves freely, normal, nontender. MUSCULOSKELETAL:   Hands, wrists, and elbows are normal and move freely, shoulders are normal and move freely.  Examination of both medial knees show healed midline incision.  No specific effusions noted.  Range of motion is excellent full extension is present flexion to over 120 degrees her knees are stable she is diffusely tender over the lateral border of the thigh distally in both knees and it extends down to the insertion of the iliotibial band

## 2024-02-01 NOTE — HISTORY OF PRESENT ILLNESS
[de-identified] : 69 y/o F pt presents with bilateral knee pain. The pt is s/p bilateral TKA. The pt has been doing physical therapy. They gave her diagnosis of iliotibial band tendinitis. The pt is now having quad pain right now. This started when lifting 3 pounds during PT. The pt does not have any groin pain. She states her pain on her knees are stable. She is overall improving.  She notes no relief with Celebrex.  [FreeTextEntry1] : Symptoms and relevant review of symptoms: She fell on 8/5/18 and landed on both knees. She sustained a right ACL tendon rupture which was treated nonoperatively by an orthopedic surgeon. She describes her pain as sharp achy throbbing, stabbing and shooting. She has the most pain with stairs. Her last day of work was in February 2019--she is no longer working and is on disability by the orthopedist who is following her ankles. 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 The patient is currently not working.

## 2024-02-01 NOTE — DISCUSSION/SUMMARY
[Medication Risks Reviewed] : Medication risks reviewed [de-identified] : 57 y/o patient is status post bilateral total knee arthroplasty. The nature of her condition and treatment options were discussed in detail. We reassured the pt that her implants are stable with no evidence of loosening or wear. She has persistent and lateral thigh pain that is likely consistent with some iliotibial band tendinitis. She should continue to do low impact exercises. She is getting continue with physical therapy if needed. The pt will f/u in July. The patient will remain out of work until reassessment in July.

## 2024-02-01 NOTE — DISCUSSION/SUMMARY
[Medication Risks Reviewed] : Medication risks reviewed [de-identified] : 59 y/o patient is status post bilateral total knee arthroplasty. The nature of her condition and treatment options were discussed in detail. We reassured the pt that her implants are stable with no evidence of loosening or wear. She has persistent and lateral thigh pain that is likely consistent with some iliotibial band tendinitis. She should continue to do low impact exercises. She is getting continue with physical therapy if needed. The pt will f/u in July. The patient will remain out of work until reassessment in July.

## 2024-02-01 NOTE — PHYSICAL EXAM
[Normal] : Gait: normal [UE/LE] : Sensory: Intact in bilateral upper & lower extremities [ALL] : dorsalis pedis, posterior tibial, femoral, popliteal, and radial 2+ and symmetric bilaterally [de-identified] : GENERAL APPEARANCE:   Well nourished and hydrated, pleasant, alert, and oriented x 3.  Appears their stated age.   Respiratory: No wheeezing, breath sounds clear. No cyanosis, no us of accesory musculature  CARDIOVASCULAR:   No apparent abnormalities.  No lower leg edema.  No varicosities.  Pedal pulses are palpable. NEUROLOGIC:   Sensation is normal, no muscle weakness in the upper or lower extremities. DERMATOLOGIC:   No apparent skin lesions, moist, warm, no rash. SPINE:   Cervical spine appears normal and moves freely; thoracic spine appears normal and moves freely; lumbosacral spine appears normal and moves freely, normal, nontender. MUSCULOSKELETAL:   Hands, wrists, and elbows are normal and move freely, shoulders are normal and move freely.  Examination of both medial knees show healed midline incision.  No specific effusions noted.  Range of motion is excellent full extension is present flexion to over 120 degrees her knees are stable she is diffusely tender over the lateral border of the thigh distally in both knees and it extends down to the insertion of the iliotibial band

## 2024-02-12 ENCOUNTER — RX RENEWAL (OUTPATIENT)
Age: 69
End: 2024-02-12

## 2024-04-09 ENCOUNTER — APPOINTMENT (OUTPATIENT)
Dept: RHEUMATOLOGY | Facility: CLINIC | Age: 69
End: 2024-04-09
Payer: MEDICARE

## 2024-04-09 VITALS
HEART RATE: 63 BPM | DIASTOLIC BLOOD PRESSURE: 67 MMHG | TEMPERATURE: 98 F | BODY MASS INDEX: 30.01 KG/M2 | OXYGEN SATURATION: 98 % | HEIGHT: 68 IN | WEIGHT: 198 LBS | SYSTOLIC BLOOD PRESSURE: 124 MMHG

## 2024-04-09 DIAGNOSIS — M17.12 UNILATERAL PRIMARY OSTEOARTHRITIS, LEFT KNEE: ICD-10-CM

## 2024-04-09 DIAGNOSIS — M13.80 OTHER SPECIFIED ARTHRITIS, UNSPECIFIED SITE: ICD-10-CM

## 2024-04-09 DIAGNOSIS — M85.80 OTHER SPECIFIED DISORDERS OF BONE DENSITY AND STRUCTURE, UNSPECIFIED SITE: ICD-10-CM

## 2024-04-09 DIAGNOSIS — M17.11 UNILATERAL PRIMARY OSTEOARTHRITIS, RIGHT KNEE: ICD-10-CM

## 2024-04-09 PROCEDURE — 99214 OFFICE O/P EST MOD 30 MIN: CPT

## 2024-04-09 RX ORDER — NAPROXEN 500 MG/1
500 TABLET ORAL
Qty: 60 | Refills: 1 | Status: DISCONTINUED | COMMUNITY
Start: 2023-01-11 | End: 2024-04-09

## 2024-04-09 RX ORDER — IBUPROFEN 800 MG/1
800 TABLET, FILM COATED ORAL
Qty: 60 | Refills: 2 | Status: ACTIVE | COMMUNITY
Start: 2024-04-09 | End: 1900-01-01

## 2024-04-10 ENCOUNTER — APPOINTMENT (OUTPATIENT)
Dept: ORTHOPEDIC SURGERY | Facility: CLINIC | Age: 69
End: 2024-04-10

## 2024-05-24 ENCOUNTER — NON-APPOINTMENT (OUTPATIENT)
Age: 69
End: 2024-05-24

## 2024-06-04 ENCOUNTER — RX RENEWAL (OUTPATIENT)
Age: 69
End: 2024-06-04

## 2024-06-04 RX ORDER — METHOTREXATE 25 MG/ML
50 INJECTION, SOLUTION INTRA-ARTERIAL; INTRAMUSCULAR; INTRAVENOUS
Qty: 4 | Refills: 1 | Status: ACTIVE | COMMUNITY
Start: 2021-02-02 | End: 1900-01-01

## 2024-06-11 ENCOUNTER — RX RENEWAL (OUTPATIENT)
Age: 69
End: 2024-06-11

## 2024-06-11 RX ORDER — FOLIC ACID 1 MG/1
1 TABLET ORAL
Qty: 90 | Refills: 2 | Status: ACTIVE | COMMUNITY
Start: 2020-08-20 | End: 1900-01-01

## 2024-06-12 ENCOUNTER — RX RENEWAL (OUTPATIENT)
Age: 69
End: 2024-06-12

## 2024-06-12 ENCOUNTER — NON-APPOINTMENT (OUTPATIENT)
Age: 69
End: 2024-06-12

## 2024-06-12 RX ORDER — CELECOXIB 200 MG/1
200 CAPSULE ORAL
Qty: 84 | Refills: 0 | Status: ACTIVE | COMMUNITY
Start: 2023-11-27 | End: 1900-01-01

## 2024-07-09 ENCOUNTER — RX RENEWAL (OUTPATIENT)
Age: 69
End: 2024-07-09

## 2024-07-31 ENCOUNTER — APPOINTMENT (OUTPATIENT)
Dept: ORTHOPEDIC SURGERY | Facility: CLINIC | Age: 69
End: 2024-07-31
Payer: OTHER MISCELLANEOUS

## 2024-07-31 VITALS
BODY MASS INDEX: 30.01 KG/M2 | WEIGHT: 198 LBS | HEIGHT: 68 IN | HEART RATE: 64 BPM | SYSTOLIC BLOOD PRESSURE: 138 MMHG | DIASTOLIC BLOOD PRESSURE: 76 MMHG

## 2024-07-31 DIAGNOSIS — M76.30 ILIOTIBIAL BAND SYNDROME, UNSPECIFIED LEG: ICD-10-CM

## 2024-07-31 DIAGNOSIS — Z96.653 AFTERCARE FOLLOWING JOINT REPLACEMENT SURGERY: ICD-10-CM

## 2024-07-31 DIAGNOSIS — Z47.1 AFTERCARE FOLLOWING JOINT REPLACEMENT SURGERY: ICD-10-CM

## 2024-07-31 DIAGNOSIS — Z96.653 PRESENCE OF ARTIFICIAL KNEE JOINT, BILATERAL: ICD-10-CM

## 2024-07-31 PROCEDURE — 73562 X-RAY EXAM OF KNEE 3: CPT | Mod: 50

## 2024-07-31 PROCEDURE — G2211 COMPLEX E/M VISIT ADD ON: CPT | Mod: NC

## 2024-07-31 PROCEDURE — 99214 OFFICE O/P EST MOD 30 MIN: CPT

## 2024-07-31 NOTE — END OF VISIT
[FreeTextEntry3] : I, Lauren Fischer, acted solely as a scribe for Dr. Jose Alfredo Grewal on this date 07/31/2024. I personally performed the services described in the documentation, reviewed the documentation recorded by the scribe in my presence, and it accurately and completely records my words and actions.   I, Jose Alfredo Grewal MD, personally performed the services described in the documentation, reviewed the documentation recorded by the scribe in my presence and it accurately and completely records my words and actions.

## 2024-07-31 NOTE — HISTORY OF PRESENT ILLNESS
[de-identified] : Es is a 68-year-old female that does present today for follow-up evaluation status post bilateral total knee arthroplasty on 7/18/2023 as a direct result of a previous work-related injury.  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 Overall, she feels that she is doing well.  With regard to the left knee specifically she does report some intermittent, mild, sometimes moderate dull and sharp twinges of discomfort localized to the lateral aspect of the knee.  She typically exacerbated when sitting with her knee in extended position for any significant amount of time.  She denies any crepitus to this area.  No mechanical symptoms or instability of the knee some cells.  No radicular pain or paresthesia. She has been attending acupuncture more recently which she has found to be helpful in addition to being compliant with her home exercise program provided by her previous physical therapist. [FreeTextEntry1] : Symptoms and relevant review of symptoms: She fell on 8/5/18 and landed on both knees. She sustained a right ACL tendon rupture which was treated nonoperatively by an orthopedic surgeon. She describes her pain as sharp achy throbbing, stabbing and shooting. She has the most pain with stairs. Her last day of work was in February 2019--she is no longer working and is on disability by the orthopedist who is following her ankles. 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 The patient is currently not working.

## 2024-07-31 NOTE — PHYSICAL EXAM
[ALL] : dorsalis pedis, posterior tibial, femoral, popliteal, and radial 2+ and symmetric bilaterally [LE] : Sensory: Intact in bilateral lower extremities [Normal] : Alert and in no acute distress [Poor Appearance] : well-appearing [de-identified] : GENERAL APPEARANCE:   Well nourished and hydrated, pleasant, alert, and oriented x 3.  Appears their stated age.   Respiratory: No wheeezing, breath sounds clear. No cyanosis, no us of accesory musculature  CARDIOVASCULAR:   No apparent abnormalities.  No lower leg edema.  No varicosities.  Pedal pulses are palpable. NEUROLOGIC:   Sensation is normal, no muscle weakness in the upper or lower extremities. DERMATOLOGIC:   No apparent skin lesions, moist, warm, no rash. SPINE:   Cervical spine appears normal and moves freely; thoracic spine appears normal and moves freely; lumbosacral spine appears normal and moves freely, normal, nontender. MUSCULOSKELETAL:   Hands, wrists, and elbows are normal and move freely, shoulders are normal and move freely.  Examination of both medial knees show healed midline incision.  No specific effusions noted.  Range of motion is 0-120 degrees. Her knees are stable.   [de-identified] : X-rays of the bilateral knees demonstrate bilateral total knee arthroplasty with implants in good position.  No evidence of implant loosening wear or subsidence

## 2024-07-31 NOTE — DISCUSSION/SUMMARY
[Medication Risks Reviewed] : Medication risks reviewed [de-identified] : 67 y/o patient is status post bilateral total knee arthroplasty. The nature of her condition and treatment options were discussed in detail. I reassured the pt that her implants are stable with no evidence of loosening or wear. She may take Tylenol and NSAIDs as needed. She should continue to do low impact exercises. She is getting continue with physical therapy if needed. The pt will f/u 5 years after the surgery.  The patient notes that she is currently retired.  From my standpoint she is functioning well.  She only needs routine radiographic surveillance.  Our next follow-up will be in 5 years

## 2024-08-14 ENCOUNTER — NON-APPOINTMENT (OUTPATIENT)
Age: 69
End: 2024-08-14

## 2024-08-15 ENCOUNTER — APPOINTMENT (OUTPATIENT)
Dept: RHEUMATOLOGY | Facility: CLINIC | Age: 69
End: 2024-08-15
Payer: MEDICARE

## 2024-08-15 VITALS
DIASTOLIC BLOOD PRESSURE: 69 MMHG | TEMPERATURE: 97.5 F | HEART RATE: 56 BPM | BODY MASS INDEX: 30.92 KG/M2 | SYSTOLIC BLOOD PRESSURE: 149 MMHG | HEIGHT: 68 IN | WEIGHT: 204 LBS | OXYGEN SATURATION: 100 %

## 2024-08-15 DIAGNOSIS — M17.11 UNILATERAL PRIMARY OSTEOARTHRITIS, RIGHT KNEE: ICD-10-CM

## 2024-08-15 DIAGNOSIS — M17.12 UNILATERAL PRIMARY OSTEOARTHRITIS, LEFT KNEE: ICD-10-CM

## 2024-08-15 DIAGNOSIS — M13.80 OTHER SPECIFIED ARTHRITIS, UNSPECIFIED SITE: ICD-10-CM

## 2024-08-15 DIAGNOSIS — M54.9 DORSALGIA, UNSPECIFIED: ICD-10-CM

## 2024-08-15 DIAGNOSIS — M85.80 OTHER SPECIFIED DISORDERS OF BONE DENSITY AND STRUCTURE, UNSPECIFIED SITE: ICD-10-CM

## 2024-08-15 PROCEDURE — 99215 OFFICE O/P EST HI 40 MIN: CPT

## 2024-08-16 RX ORDER — TOFACITINIB 11 MG/1
11 TABLET, FILM COATED, EXTENDED RELEASE ORAL
Qty: 90 | Refills: 0 | Status: ACTIVE | COMMUNITY
Start: 2024-08-15 | End: 1900-01-01

## 2024-08-18 ENCOUNTER — NON-APPOINTMENT (OUTPATIENT)
Age: 69
End: 2024-08-18

## 2024-08-19 ENCOUNTER — OUTPATIENT (OUTPATIENT)
Dept: OUTPATIENT SERVICES | Facility: HOSPITAL | Age: 69
LOS: 1 days | End: 2024-08-19

## 2024-08-19 ENCOUNTER — APPOINTMENT (OUTPATIENT)
Dept: INTERNAL MEDICINE | Facility: CLINIC | Age: 69
End: 2024-08-19

## 2024-08-19 ENCOUNTER — NON-APPOINTMENT (OUTPATIENT)
Age: 69
End: 2024-08-19

## 2024-08-19 DIAGNOSIS — Z98.890 OTHER SPECIFIED POSTPROCEDURAL STATES: Chronic | ICD-10-CM

## 2024-09-04 ENCOUNTER — RX RENEWAL (OUTPATIENT)
Age: 69
End: 2024-09-04

## 2024-09-25 ENCOUNTER — APPOINTMENT (OUTPATIENT)
Dept: ORTHOPEDIC SURGERY | Facility: CLINIC | Age: 69
End: 2024-09-25
Payer: OTHER MISCELLANEOUS

## 2024-09-25 VITALS — HEIGHT: 68 IN | WEIGHT: 204 LBS | BODY MASS INDEX: 30.92 KG/M2

## 2024-09-25 DIAGNOSIS — M48.061 SPINAL STENOSIS, LUMBAR REGION WITHOUT NEUROGENIC CLAUDICATION: ICD-10-CM

## 2024-09-25 DIAGNOSIS — M76.30 ILIOTIBIAL BAND SYNDROME, UNSPECIFIED LEG: ICD-10-CM

## 2024-09-25 PROCEDURE — 99213 OFFICE O/P EST LOW 20 MIN: CPT

## 2024-09-25 RX ORDER — IBUPROFEN 400 MG/1
400 TABLET, FILM COATED ORAL 3 TIMES DAILY
Qty: 60 | Refills: 2 | Status: ACTIVE | COMMUNITY
Start: 2024-09-25 | End: 1900-01-01

## 2024-09-25 NOTE — HISTORY OF PRESENT ILLNESS
[Lower back] : lower back [Work related] : work related [Gradual] : gradual [Sudden] : sudden [Burning] : burning [Dull/Aching] : dull/aching [Radiating] : radiating [Intermittent] : intermittent [Household chores] : household chores [Bending forward] : bending forward [Stairs] : stairs [Not working due to injury] : Work status: not working due to injury [7] : 7 [Nothing helps with pain getting better] : Nothing helps with pain getting better [de-identified] : Patient Complaint - WC 8/6/18/2018 Case [id number] 6/3/20: Bilateral thigh pain/burning x 1 week. History of lumbar condition/pain mgt STEVEN's and treatment. Right leg giving way episode. Also c/o R knee pain. Requesting injection. 6/10/20: f/u R knee Euflexxa #2 6/17/20: f/u R knee euflexxa #3, continued right leg weakness. Burning thighs - radiates to legs. Awaiting auth for MRI Lumbar spine. Pain knees with ambulation, buckling. Swelling R ankle. 7/15/20 f/u back and bilat knees MRI auth pending 8/24/2020: f/u margie knees better w/PT MRI LS auth pending 9/30/20 f/u R ankle and both knees 10/28/20 f/u R heel and margie knees HEP/PT MRI auth pending Not working 12/9/20 f/u R knee pain, bilateral radiating burning post leg pain and R achilles. HE/PT , Not working auth pending 1/20/21 Euflexxa #1 margie knees LBP radiating to margie thighs/knees 1/27/21 Euflexxa # 2 margie knees lbp radiating to legs. Waiting for auth for mri lumbar spine. 2/10/21: Euflexxa #3 margie knees. Just got auth for EMG. 3/10/21 f/u radiating lbp to margie thighs, knee pain HEP/PT 4/14/21: f/u L > R radiating leg pain to knees. Pain mgt PT Not working 5/26/21 f/u lbp radiating L > R postero-lateral thigh and ant med knee. HEP/PT/Pain mgt Not working 7/7/21 f/u lbp radiating to post thighs and L > R knee. Pain mgt pending 8/25/21 f/u b/l knees - pain with stairs and prolonged standing/ambulation. Aggravates R ankle issue and lb. Continued soreness. STEVEN's not auth. Following with pain mgmt. Dr. Cooper. > Left leg radiating pain. 9/29/21 f/u margie knees, low back. PT not authorized. Not working 12/29/21 f/u margie knees LS. Recent vein surgery Increasing lateral L knee poain. Pain mgt/STEVEN pending 2/9/22 f/u margie knees. pain mgmt. STEVEN pending. 6/22/22  f/u margie knees, back  Considering tka L > R at end of year 8/3/22  f/u margie knees lumbar spine  PT auth pending  Not working  Takes Naprosyn/Rheum 9/28/22  f/u lbp, margie knees.  Restarted PT  Pain Mgt  Takes MTx and Naprosyn prnNot working 11/9/22 f/u lbp, margie knees. She saw Dr. Godoy, pain mgmt, Dr. Starks, rheum on MTx, naprosyn helps her manage. She is scheduled for margie TKA 2/6/22 (Dr. Grewal) she continues to benefit from PT. MRI Lumbar spine - sacroiliits. Increased difficulty with ambulation, sense of leg heaviness.  1/11/23: f/u lbp, margie knees. She is attending PT. Waiting for auth to have TKR done.  3/1/23: f/u lbp, margie knees, R achilles.  Still waiting for Auth for TKR. Just finished PT.  Symptoms the same.  9/27/23: f/u margie knees and back. Back pain is stable. Still having intermittent pain and soreness. s/p margie  TKR 7/18/23 by Dr. Grewal. Going to PT.  Right ankle doing well. Now c/o swelling to her left ankle after her knee replacement surgery.  Uses stockings, off blood thinners 1/10/24: f/u amrgie knees and back. Going to PT with some relief. Taking celebrex with some relief.  Retired 9/25/24  f/u margie knees and low back.  Increasing L latreral/itb pain. 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 Grewal for knees [de-identified] : does have xr of lower back

## 2024-09-25 NOTE — PHYSICAL EXAM
[Left] : left knee [Right] : right knee [5___] : quadriceps 5[unfilled]/5 [FreeTextEntry8] : belt like distribution lower back/buttocks [] : no effusion [TWNoteComboBox7] : flexion 120 degrees

## 2024-10-14 ENCOUNTER — RX RENEWAL (OUTPATIENT)
Age: 69
End: 2024-10-14

## 2024-10-30 ENCOUNTER — RX RENEWAL (OUTPATIENT)
Age: 69
End: 2024-10-30

## 2024-11-13 ENCOUNTER — APPOINTMENT (OUTPATIENT)
Dept: ORTHOPEDIC SURGERY | Facility: CLINIC | Age: 69
End: 2024-11-13
Payer: MEDICARE

## 2024-11-13 VITALS — HEIGHT: 68 IN | BODY MASS INDEX: 30.92 KG/M2 | WEIGHT: 204 LBS

## 2024-11-13 DIAGNOSIS — M48.061 SPINAL STENOSIS, LUMBAR REGION WITHOUT NEUROGENIC CLAUDICATION: ICD-10-CM

## 2024-11-13 DIAGNOSIS — M54.41 LUMBAGO WITH SCIATICA, RIGHT SIDE: ICD-10-CM

## 2024-11-13 DIAGNOSIS — G89.29 LUMBAGO WITH SCIATICA, RIGHT SIDE: ICD-10-CM

## 2024-11-13 PROCEDURE — 99214 OFFICE O/P EST MOD 30 MIN: CPT

## 2024-11-26 ENCOUNTER — APPOINTMENT (OUTPATIENT)
Dept: RHEUMATOLOGY | Facility: CLINIC | Age: 69
End: 2024-11-26
Payer: MEDICARE

## 2024-11-26 VITALS
WEIGHT: 194 LBS | OXYGEN SATURATION: 98 % | DIASTOLIC BLOOD PRESSURE: 72 MMHG | HEART RATE: 68 BPM | TEMPERATURE: 97.6 F | BODY MASS INDEX: 29.4 KG/M2 | SYSTOLIC BLOOD PRESSURE: 124 MMHG | HEIGHT: 68 IN

## 2024-11-26 DIAGNOSIS — M17.12 UNILATERAL PRIMARY OSTEOARTHRITIS, LEFT KNEE: ICD-10-CM

## 2024-11-26 DIAGNOSIS — M13.80 OTHER SPECIFIED ARTHRITIS, UNSPECIFIED SITE: ICD-10-CM

## 2024-11-26 DIAGNOSIS — Z79.631 LONG TERM (CURRENT) USE OF ANTIMETABOLITE AGENT: ICD-10-CM

## 2024-11-26 DIAGNOSIS — M17.11 UNILATERAL PRIMARY OSTEOARTHRITIS, RIGHT KNEE: ICD-10-CM

## 2024-11-26 DIAGNOSIS — M54.9 DORSALGIA, UNSPECIFIED: ICD-10-CM

## 2024-11-26 DIAGNOSIS — M85.80 OTHER SPECIFIED DISORDERS OF BONE DENSITY AND STRUCTURE, UNSPECIFIED SITE: ICD-10-CM

## 2024-11-26 PROCEDURE — 99215 OFFICE O/P EST HI 40 MIN: CPT

## 2024-12-19 ENCOUNTER — RX RENEWAL (OUTPATIENT)
Age: 69
End: 2024-12-19

## 2025-01-08 ENCOUNTER — APPOINTMENT (OUTPATIENT)
Dept: ORTHOPEDIC SURGERY | Facility: CLINIC | Age: 70
End: 2025-01-08
Payer: OTHER MISCELLANEOUS

## 2025-01-08 VITALS — HEIGHT: 68 IN | WEIGHT: 194 LBS | BODY MASS INDEX: 29.4 KG/M2

## 2025-01-08 DIAGNOSIS — G57.02 LESION OF SCIATIC NERVE, LEFT LOWER LIMB: ICD-10-CM

## 2025-01-08 DIAGNOSIS — M48.061 SPINAL STENOSIS, LUMBAR REGION WITHOUT NEUROGENIC CLAUDICATION: ICD-10-CM

## 2025-01-08 DIAGNOSIS — M54.9 DORSALGIA, UNSPECIFIED: ICD-10-CM

## 2025-01-08 DIAGNOSIS — M17.12 UNILATERAL PRIMARY OSTEOARTHRITIS, LEFT KNEE: ICD-10-CM

## 2025-01-08 DIAGNOSIS — M17.11 UNILATERAL PRIMARY OSTEOARTHRITIS, RIGHT KNEE: ICD-10-CM

## 2025-01-08 PROCEDURE — 99213 OFFICE O/P EST LOW 20 MIN: CPT

## 2025-01-08 RX ORDER — IBUPROFEN 400 MG/1
400 TABLET, FILM COATED ORAL 3 TIMES DAILY
Qty: 60 | Refills: 2 | Status: ACTIVE | COMMUNITY
Start: 2025-01-08 | End: 1900-01-01

## 2025-03-05 ENCOUNTER — RX RENEWAL (OUTPATIENT)
Age: 70
End: 2025-03-05

## 2025-03-20 ENCOUNTER — APPOINTMENT (OUTPATIENT)
Dept: RHEUMATOLOGY | Facility: CLINIC | Age: 70
End: 2025-03-20

## 2025-04-07 ENCOUNTER — RX RENEWAL (OUTPATIENT)
Age: 70
End: 2025-04-07

## 2025-04-09 ENCOUNTER — APPOINTMENT (OUTPATIENT)
Dept: ORTHOPEDIC SURGERY | Facility: CLINIC | Age: 70
End: 2025-04-09
Payer: OTHER MISCELLANEOUS

## 2025-04-09 VITALS — WEIGHT: 194 LBS | BODY MASS INDEX: 29.4 KG/M2 | HEIGHT: 68 IN

## 2025-04-09 DIAGNOSIS — M48.061 SPINAL STENOSIS, LUMBAR REGION WITHOUT NEUROGENIC CLAUDICATION: ICD-10-CM

## 2025-04-09 DIAGNOSIS — M54.41 LUMBAGO WITH SCIATICA, RIGHT SIDE: ICD-10-CM

## 2025-04-09 DIAGNOSIS — M76.30 ILIOTIBIAL BAND SYNDROME, UNSPECIFIED LEG: ICD-10-CM

## 2025-04-09 DIAGNOSIS — G89.29 LUMBAGO WITH SCIATICA, RIGHT SIDE: ICD-10-CM

## 2025-04-09 PROCEDURE — 99213 OFFICE O/P EST LOW 20 MIN: CPT

## 2025-05-14 ENCOUNTER — APPOINTMENT (OUTPATIENT)
Dept: GASTROENTEROLOGY | Facility: CLINIC | Age: 70
End: 2025-05-14

## 2025-06-25 ENCOUNTER — APPOINTMENT (OUTPATIENT)
Dept: ORTHOPEDIC SURGERY | Facility: CLINIC | Age: 70
End: 2025-06-25
Payer: OTHER MISCELLANEOUS

## 2025-06-25 VITALS — HEIGHT: 68 IN | BODY MASS INDEX: 29.4 KG/M2 | WEIGHT: 194 LBS

## 2025-06-25 PROBLEM — G62.9 NEUROPATHY: Status: RESOLVED | Noted: 2025-06-25 | Resolved: 2025-06-25

## 2025-06-25 PROBLEM — M54.16 LUMBAR RADICULOPATHY: Status: ACTIVE | Noted: 2025-06-25

## 2025-06-25 PROCEDURE — 99213 OFFICE O/P EST LOW 20 MIN: CPT

## 2025-07-15 ENCOUNTER — NON-APPOINTMENT (OUTPATIENT)
Age: 70
End: 2025-07-15

## 2025-07-16 ENCOUNTER — APPOINTMENT (OUTPATIENT)
Dept: ORTHOPEDIC SURGERY | Facility: CLINIC | Age: 70
End: 2025-07-16
Payer: OTHER MISCELLANEOUS

## 2025-07-16 VITALS
WEIGHT: 200 LBS | HEIGHT: 68 IN | SYSTOLIC BLOOD PRESSURE: 153 MMHG | DIASTOLIC BLOOD PRESSURE: 75 MMHG | BODY MASS INDEX: 30.31 KG/M2

## 2025-07-16 PROCEDURE — 99214 OFFICE O/P EST MOD 30 MIN: CPT

## 2025-07-16 PROCEDURE — 73562 X-RAY EXAM OF KNEE 3: CPT | Mod: 50

## 2025-08-19 ENCOUNTER — APPOINTMENT (OUTPATIENT)
Dept: RHEUMATOLOGY | Facility: CLINIC | Age: 70
End: 2025-08-19
Payer: MEDICARE

## 2025-08-19 VITALS
HEIGHT: 68 IN | WEIGHT: 211 LBS | BODY MASS INDEX: 31.98 KG/M2 | OXYGEN SATURATION: 97 % | DIASTOLIC BLOOD PRESSURE: 77 MMHG | SYSTOLIC BLOOD PRESSURE: 147 MMHG | HEART RATE: 61 BPM | TEMPERATURE: 98.1 F

## 2025-08-19 DIAGNOSIS — M06.00 RHEUMATOID ARTHRITIS W/OUT RHEUMATOID FACTOR, UNSPECIFIED SITE: ICD-10-CM

## 2025-08-19 PROCEDURE — G2211 COMPLEX E/M VISIT ADD ON: CPT

## 2025-08-19 PROCEDURE — 99214 OFFICE O/P EST MOD 30 MIN: CPT

## 2025-08-19 RX ORDER — PREDNISONE 5 MG/1
5 TABLET ORAL
Qty: 90 | Refills: 0 | Status: ACTIVE | COMMUNITY
Start: 2025-08-19 | End: 1900-01-01

## 2025-08-20 ENCOUNTER — APPOINTMENT (OUTPATIENT)
Dept: ORTHOPEDIC SURGERY | Facility: CLINIC | Age: 70
End: 2025-08-20

## 2025-09-02 ENCOUNTER — NON-APPOINTMENT (OUTPATIENT)
Age: 70
End: 2025-09-02

## 2025-09-03 ENCOUNTER — APPOINTMENT (OUTPATIENT)
Dept: ORTHOPEDIC SURGERY | Facility: CLINIC | Age: 70
End: 2025-09-03
Payer: OTHER MISCELLANEOUS

## 2025-09-03 ENCOUNTER — APPOINTMENT (OUTPATIENT)
Dept: ORTHOPEDIC SURGERY | Facility: CLINIC | Age: 70
End: 2025-09-03

## 2025-09-03 VITALS — BODY MASS INDEX: 31.98 KG/M2 | WEIGHT: 211 LBS | HEIGHT: 68 IN

## 2025-09-03 DIAGNOSIS — Z96.659 PRESENCE OF UNSPECIFIED ARTIFICIAL KNEE JOINT: ICD-10-CM

## 2025-09-03 DIAGNOSIS — M48.061 SPINAL STENOSIS, LUMBAR REGION WITHOUT NEUROGENIC CLAUDICATION: ICD-10-CM

## 2025-09-03 DIAGNOSIS — M54.16 RADICULOPATHY, LUMBAR REGION: ICD-10-CM

## 2025-09-03 PROCEDURE — 99213 OFFICE O/P EST LOW 20 MIN: CPT | Mod: ACP

## 2025-09-04 ENCOUNTER — APPOINTMENT (OUTPATIENT)
Dept: RHEUMATOLOGY | Facility: CLINIC | Age: 70
End: 2025-09-04
Payer: MEDICARE

## 2025-09-04 VITALS
TEMPERATURE: 97.9 F | SYSTOLIC BLOOD PRESSURE: 118 MMHG | HEIGHT: 68 IN | HEART RATE: 76 BPM | WEIGHT: 200 LBS | OXYGEN SATURATION: 98 % | BODY MASS INDEX: 30.31 KG/M2 | DIASTOLIC BLOOD PRESSURE: 68 MMHG

## 2025-09-04 DIAGNOSIS — M17.11 UNILATERAL PRIMARY OSTEOARTHRITIS, RIGHT KNEE: ICD-10-CM

## 2025-09-04 DIAGNOSIS — M45.9 ANKYLOSING SPONDYLITIS OF UNSPECIFIED SITES IN SPINE: ICD-10-CM

## 2025-09-04 DIAGNOSIS — M85.80 OTHER SPECIFIED DISORDERS OF BONE DENSITY AND STRUCTURE, UNSPECIFIED SITE: ICD-10-CM

## 2025-09-04 DIAGNOSIS — M54.9 DORSALGIA, UNSPECIFIED: ICD-10-CM

## 2025-09-04 PROCEDURE — 99215 OFFICE O/P EST HI 40 MIN: CPT

## 2025-09-04 RX ORDER — SECUKINUMAB 25 MG/ML
125 INJECTION, SOLUTION, CONCENTRATE INTRAVENOUS
Qty: 1 | Refills: 11 | Status: ACTIVE | COMMUNITY
Start: 2025-09-04

## 2025-09-04 RX ORDER — SECUKINUMAB 25 MG/ML
125 INJECTION, SOLUTION, CONCENTRATE INTRAVENOUS
Qty: 4 | Refills: 0 | Status: ACTIVE | COMMUNITY
Start: 2025-09-04

## 2025-09-21 RX ORDER — SECUKINUMAB 25 MG/ML
125 INJECTION, SOLUTION, CONCENTRATE INTRAVENOUS
Qty: 0 | Refills: 0 | Status: ACTIVE | OUTPATIENT
Start: 2025-09-18 | End: 1900-01-01

## (undated) DEVICE — SOL IRR POUR H2O 1000ML

## (undated) DEVICE — SUT VICRYL 0 18" CT-1 UNDYED (POP-OFF)

## (undated) DEVICE — VENODYNE/SCD SLEEVE CALF MEDIUM

## (undated) DEVICE — DRSG MEPILEX 10 X 25CM (4 X 10") POST OP AG SILVER

## (undated) DEVICE — DRSG DERMABOND PRINEO 60CM

## (undated) DEVICE — PACK TOTAL KNEE

## (undated) DEVICE — WOUND IRR SURGIPHOR

## (undated) DEVICE — HOOD FLYTE STRYKER SURGICOOL W PEELAWAY

## (undated) DEVICE — SAW BLADE STRYKER OSCILLATING 18.5MMX1.24MMX104.0MM

## (undated) DEVICE — WARMING BLANKET UPPER ADULT

## (undated) DEVICE — HOOD T7 NON-PEELAWAY

## (undated) DEVICE — ZIMMER BLADE PATELLA REAMER SIZE 29

## (undated) DEVICE — ZIMMER MIXING BOWL

## (undated) DEVICE — SUT MONOCRYL 3-0 27" PS-2 UNDYED

## (undated) DEVICE — SOL IRR POUR NS 0.9% 1000ML

## (undated) DEVICE — DRAPE XL SHEET 77X98"

## (undated) DEVICE — GLV 8 PROTEXIS ORTHO (BROWN)

## (undated) DEVICE — DRAPE BACK TABLE COVER HEAVY DUTY 2 TIER 72"

## (undated) DEVICE — NDL HYPO SAFE 20G X 1.5" (YELLOW)

## (undated) DEVICE — ORTHOALIGN PLUS UNIT

## (undated) DEVICE — SYR LUER LOK 50CC

## (undated) DEVICE — SOL IRR BAG NS 0.9% 3000ML

## (undated) DEVICE — DRAPE 1/2 SHEET 40X57"

## (undated) DEVICE — GLV 8 PROTEXIS (BLUE)

## (undated) DEVICE — SAW BLADE ZIMMER RECIPROCATING DOUBLE SIDED 12.5X96X1.19MM

## (undated) DEVICE — ELCTR STRYKER NEPTUNE SMOKE EVACUATION PENCIL (GREEN)

## (undated) DEVICE — SUT VICRYL 2-0 27" CT-2 UNDYED

## (undated) DEVICE — DRAPE 3/4 SHEET 52X76"

## (undated) DEVICE — PREP CHLORAPREP HI-LITE ORANGE 26ML

## (undated) DEVICE — ZIMMER BLADE PATELLA REAMER W PILOT HOLE SZ 32

## (undated) DEVICE — DRAPE U LONG 47X70"

## (undated) DEVICE — TAPE SILK 3"

## (undated) DEVICE — ZIMMER PULSAVAC PLUS FAN KIT